# Patient Record
Sex: MALE | NOT HISPANIC OR LATINO | URBAN - METROPOLITAN AREA
[De-identification: names, ages, dates, MRNs, and addresses within clinical notes are randomized per-mention and may not be internally consistent; named-entity substitution may affect disease eponyms.]

---

## 2023-12-13 ENCOUNTER — TELEPHONE (OUTPATIENT)
Dept: HEPATOLOGY | Facility: CLINIC | Age: 68
End: 2023-12-13
Payer: MEDICARE

## 2023-12-21 ENCOUNTER — TELEPHONE (OUTPATIENT)
Dept: HEPATOLOGY | Facility: CLINIC | Age: 68
End: 2023-12-21
Payer: MEDICARE

## 2023-12-21 NOTE — TELEPHONE ENCOUNTER
Message sent to the Hepatology Coordinator to see if there are any sooner appointments to schedule for the patient.        ----- Message from Jax Capellan sent at 12/21/2023 10:32 AM CST -----  Regarding: Patient advice  Contact: Dr.Blunt Soni office called in regards to getting an sooner appointment date for Pt        office can be reached at [phone]

## 2025-01-01 ENCOUNTER — HOSPITAL ENCOUNTER (INPATIENT)
Facility: HOSPITAL | Age: 70
LOS: 1 days | DRG: 871 | End: 2025-03-28
Attending: EMERGENCY MEDICINE | Admitting: INTERNAL MEDICINE
Payer: MEDICARE

## 2025-01-01 VITALS
HEART RATE: 106 BPM | DIASTOLIC BLOOD PRESSURE: 34 MMHG | BODY MASS INDEX: 27.66 KG/M2 | SYSTOLIC BLOOD PRESSURE: 54 MMHG | WEIGHT: 176.56 LBS | RESPIRATION RATE: 24 BRPM | TEMPERATURE: 94 F | OXYGEN SATURATION: 100 %

## 2025-01-01 DIAGNOSIS — Z78.9 INTUBATION OF AIRWAY PERFORMED WITHOUT DIFFICULTY: ICD-10-CM

## 2025-01-01 DIAGNOSIS — R57.9 SHOCK: ICD-10-CM

## 2025-01-01 DIAGNOSIS — K72.91: ICD-10-CM

## 2025-01-01 DIAGNOSIS — R65.21 SEPSIS WITH ENCEPHALOPATHY AND SEPTIC SHOCK, DUE TO UNSPECIFIED ORGANISM: Primary | ICD-10-CM

## 2025-01-01 DIAGNOSIS — G93.41 ACUTE METABOLIC ENCEPHALOPATHY: ICD-10-CM

## 2025-01-01 DIAGNOSIS — G93.41 SEPSIS WITH ENCEPHALOPATHY AND SEPTIC SHOCK, DUE TO UNSPECIFIED ORGANISM: Primary | ICD-10-CM

## 2025-01-01 DIAGNOSIS — E87.29 HIGH ANION GAP METABOLIC ACIDOSIS: ICD-10-CM

## 2025-01-01 DIAGNOSIS — J96.01 ACUTE HYPOXEMIC RESPIRATORY FAILURE: ICD-10-CM

## 2025-01-01 DIAGNOSIS — A41.9 SEPSIS WITH ENCEPHALOPATHY AND SEPTIC SHOCK, DUE TO UNSPECIFIED ORGANISM: Primary | ICD-10-CM

## 2025-01-01 DIAGNOSIS — Z99.11 ON MECHANICALLY ASSISTED VENTILATION: ICD-10-CM

## 2025-01-01 DIAGNOSIS — J96.01 ACUTE RESPIRATORY FAILURE WITH HYPOXIA: ICD-10-CM

## 2025-01-01 DIAGNOSIS — N17.9 AKI (ACUTE KIDNEY INJURY): ICD-10-CM

## 2025-01-01 DIAGNOSIS — R06.02 SOB (SHORTNESS OF BREATH): ICD-10-CM

## 2025-01-01 DIAGNOSIS — K76.82 HEPATIC ENCEPHALOPATHY: ICD-10-CM

## 2025-01-01 LAB
ABSOLUTE EOSINOPHIL (OHS): 0.01 K/UL
ABSOLUTE MONOCYTE (OHS): 0.13 K/UL (ref 0.3–1)
ABSOLUTE NEUTROPHIL COUNT (OHS): 3.64 K/UL (ref 1.8–7.7)
ABSOLUTE NEUTROPHIL MANUAL (OHS): 0.4 K/UL
ADENOVIRUS: NOT DETECTED
ALBUMIN SERPL BCP-MCNC: 1.4 G/DL (ref 3.5–5.2)
ALBUMIN SERPL BCP-MCNC: 1.9 G/DL (ref 3.5–5.2)
ALBUMIN SERPL BCP-MCNC: 2 G/DL (ref 3.5–5.2)
ALLENS TEST: ABNORMAL
ALP SERPL-CCNC: 51 UNIT/L (ref 40–150)
ALP SERPL-CCNC: 60 UNIT/L (ref 40–150)
ALP SERPL-CCNC: 84 UNIT/L (ref 40–150)
ALT SERPL W/O P-5'-P-CCNC: 221 UNIT/L (ref 10–44)
ALT SERPL W/O P-5'-P-CCNC: 611 UNIT/L (ref 10–44)
ALT SERPL W/O P-5'-P-CCNC: 79 UNIT/L (ref 10–44)
AMMONIA PLAS-SCNC: 167 UMOL/L (ref 10–50)
AMMONIA PLAS-SCNC: 303 UMOL/L (ref 10–50)
ANION GAP (OHS): 26 MMOL/L (ref 8–16)
ANION GAP (OHS): 27 MMOL/L (ref 8–16)
ANION GAP (OHS): 27 MMOL/L (ref 8–16)
ANION GAP (OHS): 30 MMOL/L (ref 8–16)
APAP SERPL-MCNC: <3 UG/ML (ref 10–20)
APTT PPP: 49.2 SECONDS (ref 21–32)
APTT PPP: 88.3 SECONDS (ref 21–32)
AST SERPL-CCNC: 1164 UNIT/L (ref 11–45)
AST SERPL-CCNC: 325 UNIT/L (ref 11–45)
AST SERPL-CCNC: 4112 UNIT/L (ref 11–45)
BASOPHILS # BLD AUTO: 0.01 K/UL
BASOPHILS NFR BLD AUTO: 0.2 %
BILIRUB DIRECT SERPL-MCNC: 2.5 MG/DL (ref 0.1–0.3)
BILIRUB DIRECT SERPL-MCNC: 3.3 MG/DL (ref 0.1–0.3)
BILIRUB SERPL-MCNC: 3.2 MG/DL (ref 0.1–1)
BILIRUB SERPL-MCNC: 4 MG/DL (ref 0.1–1)
BILIRUB SERPL-MCNC: 4.5 MG/DL (ref 0.1–1)
BILIRUB UR QL STRIP.AUTO: ABNORMAL
BNP SERPL-MCNC: 356 PG/ML (ref 0–99)
BORDETELLA PARAPERTUSSIS (IS1001): NOT DETECTED
BORDETELLA PERTUSSIS (PTXP): NOT DETECTED
BUN SERPL-MCNC: 50 MG/DL (ref 8–23)
BUN SERPL-MCNC: 50 MG/DL (ref 8–23)
BUN SERPL-MCNC: 53 MG/DL (ref 8–23)
BUN SERPL-MCNC: 58 MG/DL (ref 8–23)
BURR CELLS BLD QL SMEAR: ABNORMAL
C DIFF GDH STL QL: NEGATIVE
C DIFF TOX A+B STL QL IA: NEGATIVE
CALCIUM SERPL-MCNC: 6.8 MG/DL (ref 8.7–10.5)
CALCIUM SERPL-MCNC: 7 MG/DL (ref 8.7–10.5)
CALCIUM SERPL-MCNC: 7.2 MG/DL (ref 8.7–10.5)
CALCIUM SERPL-MCNC: 8.6 MG/DL (ref 8.7–10.5)
CHLAMYDIA PNEUMONIAE: NOT DETECTED
CHLORIDE SERPL-SCNC: 105 MMOL/L (ref 95–110)
CHLORIDE SERPL-SCNC: 107 MMOL/L (ref 95–110)
CHLORIDE SERPL-SCNC: 108 MMOL/L (ref 95–110)
CHLORIDE SERPL-SCNC: 111 MMOL/L (ref 95–110)
CK SERPL-CCNC: 2699 U/L (ref 20–200)
CK SERPL-CCNC: 6168 U/L (ref 20–200)
CLARITY UR: CLEAR
CO2 SERPL-SCNC: 5 MMOL/L (ref 23–29)
CO2 SERPL-SCNC: 6 MMOL/L (ref 23–29)
COLOR UR AUTO: YELLOW
CORONAVIRUS 229E, COMMON COLD VIRUS: NOT DETECTED
CORONAVIRUS HKU1, COMMON COLD VIRUS: NOT DETECTED
CORONAVIRUS NL63, COMMON COLD VIRUS: NOT DETECTED
CORONAVIRUS OC43, COMMON COLD VIRUS: NOT DETECTED
CORTIS SERPL-MCNC: 30.3 UG/DL
CREAT SERPL-MCNC: 2.6 MG/DL (ref 0.5–1.4)
CREAT SERPL-MCNC: 2.6 MG/DL (ref 0.5–1.4)
CREAT SERPL-MCNC: 2.7 MG/DL (ref 0.5–1.4)
CREAT SERPL-MCNC: 2.7 MG/DL (ref 0.5–1.4)
DELSYS: ABNORMAL
ERYTHROCYTE [DISTWIDTH] IN BLOOD BY AUTOMATED COUNT: 15.4 % (ref 11.5–14.5)
ERYTHROCYTE [DISTWIDTH] IN BLOOD BY AUTOMATED COUNT: 15.7 % (ref 11.5–14.5)
ERYTHROCYTE [SEDIMENTATION RATE] IN BLOOD BY WESTERGREN METHOD: 18 MM/H
ERYTHROCYTE [SEDIMENTATION RATE] IN BLOOD BY WESTERGREN METHOD: 24 MM/H
ERYTHROCYTE [SEDIMENTATION RATE] IN BLOOD BY WESTERGREN METHOD: 24 MM/H
FIBRINOGEN PPP-MCNC: 299 MG/DL (ref 182–400)
FIO2: 100
FLOW: 15
FLUBV RNA NPH QL NAA+NON-PROBE: NOT DETECTED
FOLATE SERPL-MCNC: 10.3 NG/ML (ref 4–24)
GFR SERPLBLD CREATININE-BSD FMLA CKD-EPI: 25 ML/MIN/1.73/M2
GFR SERPLBLD CREATININE-BSD FMLA CKD-EPI: 25 ML/MIN/1.73/M2
GFR SERPLBLD CREATININE-BSD FMLA CKD-EPI: 26 ML/MIN/1.73/M2
GFR SERPLBLD CREATININE-BSD FMLA CKD-EPI: 26 ML/MIN/1.73/M2
GLUCOSE SERPL-MCNC: 33 MG/DL (ref 70–110)
GLUCOSE SERPL-MCNC: 360 MG/DL (ref 70–110)
GLUCOSE SERPL-MCNC: 64 MG/DL (ref 70–110)
GLUCOSE SERPL-MCNC: 72 MG/DL (ref 70–110)
GLUCOSE SERPL-MCNC: 77 MG/DL (ref 70–110)
GLUCOSE SERPL-MCNC: 81 MG/DL (ref 70–110)
GLUCOSE UR QL STRIP: NEGATIVE
HCO3 UR-SCNC: 10 MMOL/L (ref 24–28)
HCO3 UR-SCNC: 8 MMOL/L (ref 24–28)
HCO3 UR-SCNC: 8 MMOL/L (ref 24–28)
HCO3 UR-SCNC: 9.8 MMOL/L (ref 24–28)
HCT VFR BLD AUTO: 41.6 % (ref 40–54)
HCT VFR BLD AUTO: 41.6 % (ref 40–54)
HCT VFR BLD AUTO: 49.1 % (ref 40–54)
HCT VFR BLD CALC: 39 %PCV (ref 36–54)
HCT VFR BLD CALC: 41 %PCV (ref 36–54)
HGB BLD-MCNC: 12.7 GM/DL (ref 14–18)
HGB BLD-MCNC: 12.7 GM/DL (ref 14–18)
HGB BLD-MCNC: 15.4 GM/DL (ref 14–18)
HGB UR QL STRIP: NEGATIVE
HPIV1 RNA NPH QL NAA+NON-PROBE: NOT DETECTED
HPIV2 RNA NPH QL NAA+NON-PROBE: NOT DETECTED
HPIV3 RNA NPH QL NAA+NON-PROBE: NOT DETECTED
HPIV4 RNA NPH QL NAA+NON-PROBE: NOT DETECTED
HUMAN METAPNEUMOVIRUS: NOT DETECTED
IMM GRANULOCYTES # BLD AUTO: 0.04 K/UL (ref 0–0.04)
IMM GRANULOCYTES NFR BLD AUTO: 0.8 % (ref 0–0.5)
INFLUENZA A MOLECULAR (OHS): NEGATIVE
INFLUENZA A: NOT DETECTED
INFLUENZA B MOLECULAR (OHS): NEGATIVE
INR PPP: 1.8 (ref 0.8–1.2)
INR PPP: 2.1 (ref 0.8–1.2)
INR PPP: 2.5 (ref 0.8–1.2)
KETONES UR QL STRIP: NEGATIVE
LACTATE SERPL-SCNC: >12 MMOL/L (ref 0.5–2.2)
LEUKOCYTE ESTERASE UR QL STRIP: NEGATIVE
LIPASE SERPL-CCNC: 16 U/L (ref 4–60)
LIPASE SERPL-CCNC: 16 U/L (ref 4–60)
LYMPHOCYTES # BLD AUTO: 0.9 K/UL (ref 1–4.8)
LYMPHOCYTES NFR BLD MANUAL: 58 % (ref 18–48)
MAGNESIUM SERPL-MCNC: 3 MG/DL (ref 1.6–2.6)
MAGNESIUM SERPL-MCNC: 3.7 MG/DL (ref 1.6–2.6)
MCH RBC QN AUTO: 31 PG (ref 27–50)
MCH RBC QN AUTO: 31.3 PG (ref 27–50)
MCHC RBC AUTO-ENTMCNC: 30.5 G/DL (ref 32–36)
MCHC RBC AUTO-ENTMCNC: 31.4 G/DL (ref 32–36)
MCV RBC AUTO: 100 FL (ref 82–98)
MCV RBC AUTO: 102 FL (ref 82–98)
METHEMOGLOBIN: -0.3 % (ref 0–3)
MODE: ABNORMAL
MONOCYTES NFR BLD MANUAL: 12 % (ref 4–15)
MYCOPLASMA PNEUMONIAE: NOT DETECTED
NEUTROPHILS NFR BLD MANUAL: 30 % (ref 38–73)
NITRITE UR QL STRIP: NEGATIVE
NUCLEATED RBC (/100WBC) (OHS): 2 /100 WBC
NUCLEATED RBC (/100WBC) (OHS): 26 /100 WBC
OHS QRS DURATION: 108 MS
OHS QTC CALCULATION: 512 MS
PCO2 BLDA: 42.6 MMHG (ref 35–45)
PCO2 BLDA: 47.8 MMHG (ref 35–45)
PCO2 BLDA: 55.1 MMHG (ref 35–45)
PCO2 BLDA: 61.1 MMHG (ref 35–45)
PEEP: 10
PEEP: 5
PEEP: 5
PH SMN: 6.82 [PH] (ref 7.35–7.45)
PH SMN: 6.83 [PH] (ref 7.35–7.45)
PH SMN: 6.86 [PH] (ref 7.35–7.45)
PH SMN: 6.88 [PH] (ref 7.35–7.45)
PH UR STRIP: 6 [PH]
PHOSPHATE SERPL-MCNC: 10 MG/DL (ref 2.7–4.5)
PLATELET # BLD AUTO: 230 K/UL (ref 150–450)
PLATELET # BLD AUTO: 87 K/UL (ref 150–450)
PLATELET BLD QL SMEAR: ABNORMAL
PMV BLD AUTO: 9.5 FL (ref 9.2–12.9)
PMV BLD AUTO: 9.7 FL (ref 9.2–12.9)
PO2 BLDA: 102 MMHG (ref 80–100)
PO2 BLDA: 114 MMHG (ref 80–100)
PO2 BLDA: 63 MMHG (ref 80–100)
PO2 BLDA: 98 MMHG (ref 80–100)
POC BE: -24 MMOL/L
POC BE: -24 MMOL/L
POC BE: -25 MMOL/L
POC BE: -26 MMOL/L
POC IONIZED CALCIUM: 1 MMOL/L (ref 1.06–1.42)
POC IONIZED CALCIUM: 1.03 MMOL/L (ref 1.06–1.42)
POC SATURATED O2: 69 % (ref 95–100)
POC SATURATED O2: 89 % (ref 95–100)
POC SATURATED O2: 90 % (ref 95–100)
POC SATURATED O2: 92 % (ref 95–100)
POCT GLUCOSE: 100 MG/DL (ref 70–110)
POCT GLUCOSE: 100 MG/DL (ref 70–110)
POCT GLUCOSE: 101 MG/DL (ref 70–110)
POCT GLUCOSE: 105 MG/DL (ref 70–110)
POCT GLUCOSE: 110 MG/DL (ref 70–110)
POCT GLUCOSE: 38 MG/DL (ref 70–110)
POCT GLUCOSE: 64 MG/DL (ref 70–110)
POCT GLUCOSE: 69 MG/DL (ref 70–110)
POCT GLUCOSE: 73 MG/DL (ref 70–110)
POCT GLUCOSE: 75 MG/DL (ref 70–110)
POCT GLUCOSE: 78 MG/DL (ref 70–110)
POCT GLUCOSE: 81 MG/DL (ref 70–110)
POCT GLUCOSE: 85 MG/DL (ref 70–110)
POCT GLUCOSE: 96 MG/DL (ref 70–110)
POIKILOCYTOSIS BLD QL SMEAR: ABNORMAL
POLYCHROMASIA BLD QL SMEAR: ABNORMAL
POTASSIUM BLD-SCNC: 3.1 MMOL/L (ref 3.5–5.1)
POTASSIUM BLD-SCNC: 3.4 MMOL/L (ref 3.5–5.1)
POTASSIUM SERPL-SCNC: 3.2 MMOL/L (ref 3.5–5.1)
POTASSIUM SERPL-SCNC: 3.3 MMOL/L (ref 3.5–5.1)
POTASSIUM SERPL-SCNC: 3.9 MMOL/L (ref 3.5–5.1)
POTASSIUM SERPL-SCNC: 4.9 MMOL/L (ref 3.5–5.1)
PROCALCITONIN SERPL-MCNC: 7.52 NG/ML
PROLACTIN SERPL IA-MCNC: 31.9 NG/ML (ref 3.5–19.4)
PROT SERPL-MCNC: 3.4 GM/DL (ref 6–8.4)
PROT SERPL-MCNC: 3.5 GM/DL (ref 6–8.4)
PROT SERPL-MCNC: 4.7 GM/DL (ref 6–8.4)
PROT UR QL STRIP: ABNORMAL
PROTHROMBIN TIME: 19.6 SECONDS (ref 9–12.5)
PROTHROMBIN TIME: 22.6 SECONDS (ref 9–12.5)
PROTHROMBIN TIME: 27.1 SECONDS (ref 9–12.5)
RBC # BLD AUTO: 4.1 M/UL (ref 4.6–6.2)
RBC # BLD AUTO: 4.92 M/UL (ref 4.6–6.2)
RELATIVE EOSINOPHIL (OHS): 0.2 %
RELATIVE LYMPHOCYTE (OHS): 19 % (ref 18–48)
RELATIVE MONOCYTE (OHS): 2.7 % (ref 4–15)
RELATIVE NEUTROPHIL (OHS): 77.1 % (ref 38–73)
RESPIRATORY INFECTION PANEL SOURCE: NORMAL
RSV RNA NPH QL NAA+NON-PROBE: NOT DETECTED
RV+EV RNA NPH QL NAA+NON-PROBE: NOT DETECTED
SALICYLATES SERPL-MCNC: <5 MG/DL (ref 15–30)
SAMPLE: ABNORMAL
SARS-COV-2 RDRP RESP QL NAA+PROBE: NEGATIVE
SARS-COV-2 RNA RESP QL NAA+PROBE: NOT DETECTED
SITE: ABNORMAL
SODIUM BLD-SCNC: 134 MMOL/L (ref 136–145)
SODIUM BLD-SCNC: 139 MMOL/L (ref 136–145)
SODIUM SERPL-SCNC: 139 MMOL/L (ref 136–145)
SODIUM SERPL-SCNC: 140 MMOL/L (ref 136–145)
SODIUM SERPL-SCNC: 141 MMOL/L (ref 136–145)
SODIUM SERPL-SCNC: 144 MMOL/L (ref 136–145)
SP GR UR STRIP: 1.01
T3FREE SERPL-MCNC: <1.5 PG/ML (ref 2.3–4.2)
T4 FREE SERPL-MCNC: 0.91 NG/DL (ref 0.71–1.51)
TROPONIN I SERPL DL<=0.01 NG/ML-MCNC: 0.05 NG/ML
TROPONIN I SERPL DL<=0.01 NG/ML-MCNC: 0.06 NG/ML
TROPONIN I SERPL DL<=0.01 NG/ML-MCNC: 0.07 NG/ML
TSH SERPL-ACNC: 10.78 UIU/ML (ref 0.4–4)
UROBILINOGEN UR STRIP-ACNC: NEGATIVE EU/DL
VT: 460
WBC # BLD AUTO: 1.41 K/UL (ref 3.9–12.7)
WBC # BLD AUTO: 4.73 K/UL (ref 3.9–12.7)

## 2025-01-01 PROCEDURE — 84484 ASSAY OF TROPONIN QUANT: CPT

## 2025-01-01 PROCEDURE — 83605 ASSAY OF LACTIC ACID: CPT

## 2025-01-01 PROCEDURE — 96365 THER/PROPH/DIAG IV INF INIT: CPT

## 2025-01-01 PROCEDURE — 82746 ASSAY OF FOLIC ACID SERUM: CPT | Performed by: INTERNAL MEDICINE

## 2025-01-01 PROCEDURE — 82550 ASSAY OF CK (CPK): CPT | Performed by: INTERNAL MEDICINE

## 2025-01-01 PROCEDURE — 85014 HEMATOCRIT: CPT

## 2025-01-01 PROCEDURE — 84145 PROCALCITONIN (PCT): CPT

## 2025-01-01 PROCEDURE — 87427 SHIGA-LIKE TOXIN AG IA: CPT | Performed by: EMERGENCY MEDICINE

## 2025-01-01 PROCEDURE — 82962 GLUCOSE BLOOD TEST: CPT

## 2025-01-01 PROCEDURE — 37799 UNLISTED PX VASCULAR SURGERY: CPT

## 2025-01-01 PROCEDURE — 93005 ELECTROCARDIOGRAM TRACING: CPT

## 2025-01-01 PROCEDURE — 25000242 PHARM REV CODE 250 ALT 637 W/ HCPCS

## 2025-01-01 PROCEDURE — 25000003 PHARM REV CODE 250: Performed by: INTERNAL MEDICINE

## 2025-01-01 PROCEDURE — 84100 ASSAY OF PHOSPHORUS: CPT

## 2025-01-01 PROCEDURE — 82248 BILIRUBIN DIRECT: CPT

## 2025-01-01 PROCEDURE — 84295 ASSAY OF SERUM SODIUM: CPT

## 2025-01-01 PROCEDURE — 83735 ASSAY OF MAGNESIUM: CPT

## 2025-01-01 PROCEDURE — 83735 ASSAY OF MAGNESIUM: CPT | Performed by: EMERGENCY MEDICINE

## 2025-01-01 PROCEDURE — 96366 THER/PROPH/DIAG IV INF ADDON: CPT

## 2025-01-01 PROCEDURE — 87046 STOOL CULTR AEROBIC BACT EA: CPT | Performed by: EMERGENCY MEDICINE

## 2025-01-01 PROCEDURE — 87205 SMEAR GRAM STAIN: CPT

## 2025-01-01 PROCEDURE — 25000003 PHARM REV CODE 250

## 2025-01-01 PROCEDURE — 94002 VENT MGMT INPAT INIT DAY: CPT

## 2025-01-01 PROCEDURE — 82140 ASSAY OF AMMONIA: CPT

## 2025-01-01 PROCEDURE — 80076 HEPATIC FUNCTION PANEL: CPT

## 2025-01-01 PROCEDURE — 31500 INSERT EMERGENCY AIRWAY: CPT

## 2025-01-01 PROCEDURE — 83605 ASSAY OF LACTIC ACID: CPT | Performed by: EMERGENCY MEDICINE

## 2025-01-01 PROCEDURE — 84075 ASSAY ALKALINE PHOSPHATASE: CPT | Performed by: EMERGENCY MEDICINE

## 2025-01-01 PROCEDURE — 85025 COMPLETE CBC W/AUTO DIFF WBC: CPT

## 2025-01-01 PROCEDURE — 82803 BLOOD GASES ANY COMBINATION: CPT

## 2025-01-01 PROCEDURE — 63600175 PHARM REV CODE 636 W HCPCS

## 2025-01-01 PROCEDURE — 25000003 PHARM REV CODE 250: Performed by: EMERGENCY MEDICINE

## 2025-01-01 PROCEDURE — 96360 HYDRATION IV INFUSION INIT: CPT | Mod: 59

## 2025-01-01 PROCEDURE — 81003 URINALYSIS AUTO W/O SCOPE: CPT | Performed by: EMERGENCY MEDICINE

## 2025-01-01 PROCEDURE — 51702 INSERT TEMP BLADDER CATH: CPT

## 2025-01-01 PROCEDURE — 80143 DRUG ASSAY ACETAMINOPHEN: CPT

## 2025-01-01 PROCEDURE — 27100108

## 2025-01-01 PROCEDURE — 94761 N-INVAS EAR/PLS OXIMETRY MLT: CPT

## 2025-01-01 PROCEDURE — 82330 ASSAY OF CALCIUM: CPT

## 2025-01-01 PROCEDURE — 87081 CULTURE SCREEN ONLY: CPT

## 2025-01-01 PROCEDURE — 82140 ASSAY OF AMMONIA: CPT | Performed by: EMERGENCY MEDICINE

## 2025-01-01 PROCEDURE — 85610 PROTHROMBIN TIME: CPT

## 2025-01-01 PROCEDURE — 63600175 PHARM REV CODE 636 W HCPCS: Performed by: INTERNAL MEDICINE

## 2025-01-01 PROCEDURE — 94003 VENT MGMT INPAT SUBQ DAY: CPT

## 2025-01-01 PROCEDURE — 83690 ASSAY OF LIPASE: CPT | Performed by: INTERNAL MEDICINE

## 2025-01-01 PROCEDURE — 27100171 HC OXYGEN HIGH FLOW UP TO 24 HOURS

## 2025-01-01 PROCEDURE — 94640 AIRWAY INHALATION TREATMENT: CPT

## 2025-01-01 PROCEDURE — 36600 WITHDRAWAL OF ARTERIAL BLOOD: CPT

## 2025-01-01 PROCEDURE — 84439 ASSAY OF FREE THYROXINE: CPT | Performed by: EMERGENCY MEDICINE

## 2025-01-01 PROCEDURE — U0002 COVID-19 LAB TEST NON-CDC: HCPCS | Performed by: EMERGENCY MEDICINE

## 2025-01-01 PROCEDURE — 5A12012 PERFORMANCE OF CARDIAC OUTPUT, SINGLE, MANUAL: ICD-10-PCS | Performed by: INTERNAL MEDICINE

## 2025-01-01 PROCEDURE — 82533 TOTAL CORTISOL: CPT | Performed by: INTERNAL MEDICINE

## 2025-01-01 PROCEDURE — 99900035 HC TECH TIME PER 15 MIN (STAT)

## 2025-01-01 PROCEDURE — 93010 ELECTROCARDIOGRAM REPORT: CPT | Mod: ,,, | Performed by: INTERNAL MEDICINE

## 2025-01-01 PROCEDURE — P9047 ALBUMIN (HUMAN), 25%, 50ML: HCPCS

## 2025-01-01 PROCEDURE — 84481 FREE ASSAY (FT-3): CPT

## 2025-01-01 PROCEDURE — 85610 PROTHROMBIN TIME: CPT | Performed by: EMERGENCY MEDICINE

## 2025-01-01 PROCEDURE — 84484 ASSAY OF TROPONIN QUANT: CPT | Performed by: EMERGENCY MEDICINE

## 2025-01-01 PROCEDURE — 85730 THROMBOPLASTIN TIME PARTIAL: CPT | Performed by: EMERGENCY MEDICINE

## 2025-01-01 PROCEDURE — 96368 THER/DIAG CONCURRENT INF: CPT

## 2025-01-01 PROCEDURE — 87449 NOS EACH ORGANISM AG IA: CPT

## 2025-01-01 PROCEDURE — 85018 HEMOGLOBIN: CPT

## 2025-01-01 PROCEDURE — 85025 COMPLETE CBC W/AUTO DIFF WBC: CPT | Performed by: EMERGENCY MEDICINE

## 2025-01-01 PROCEDURE — 95816 EEG AWAKE AND DROWSY: CPT

## 2025-01-01 PROCEDURE — 0BH17EZ INSERTION OF ENDOTRACHEAL AIRWAY INTO TRACHEA, VIA NATURAL OR ARTIFICIAL OPENING: ICD-10-PCS | Performed by: INTERNAL MEDICINE

## 2025-01-01 PROCEDURE — 85730 THROMBOPLASTIN TIME PARTIAL: CPT

## 2025-01-01 PROCEDURE — 87449 NOS EACH ORGANISM AG IA: CPT | Performed by: EMERGENCY MEDICINE

## 2025-01-01 PROCEDURE — 80179 DRUG ASSAY SALICYLATE: CPT

## 2025-01-01 PROCEDURE — 83880 ASSAY OF NATRIURETIC PEPTIDE: CPT | Performed by: EMERGENCY MEDICINE

## 2025-01-01 PROCEDURE — 96361 HYDRATE IV INFUSION ADD-ON: CPT

## 2025-01-01 PROCEDURE — 83690 ASSAY OF LIPASE: CPT | Performed by: EMERGENCY MEDICINE

## 2025-01-01 PROCEDURE — 87502 INFLUENZA DNA AMP PROBE: CPT | Performed by: EMERGENCY MEDICINE

## 2025-01-01 PROCEDURE — 87040 BLOOD CULTURE FOR BACTERIA: CPT | Performed by: EMERGENCY MEDICINE

## 2025-01-01 PROCEDURE — 02HV33Z INSERTION OF INFUSION DEVICE INTO SUPERIOR VENA CAVA, PERCUTANEOUS APPROACH: ICD-10-PCS | Performed by: INTERNAL MEDICINE

## 2025-01-01 PROCEDURE — 0202U NFCT DS 22 TRGT SARS-COV-2: CPT

## 2025-01-01 PROCEDURE — 5A1935Z RESPIRATORY VENTILATION, LESS THAN 24 CONSECUTIVE HOURS: ICD-10-PCS | Performed by: INTERNAL MEDICINE

## 2025-01-01 PROCEDURE — 85384 FIBRINOGEN ACTIVITY: CPT

## 2025-01-01 PROCEDURE — 36415 COLL VENOUS BLD VENIPUNCTURE: CPT | Performed by: EMERGENCY MEDICINE

## 2025-01-01 PROCEDURE — 95816 EEG AWAKE AND DROWSY: CPT | Mod: 26,,, | Performed by: PSYCHIATRY & NEUROLOGY

## 2025-01-01 PROCEDURE — 84146 ASSAY OF PROLACTIN: CPT

## 2025-01-01 PROCEDURE — 82550 ASSAY OF CK (CPK): CPT

## 2025-01-01 PROCEDURE — 20000000 HC ICU ROOM

## 2025-01-01 PROCEDURE — 63600367 HC NITRIC OXIDE PER HOUR

## 2025-01-01 PROCEDURE — 99291 CRITICAL CARE FIRST HOUR: CPT

## 2025-01-01 PROCEDURE — 80048 BASIC METABOLIC PNL TOTAL CA: CPT

## 2025-01-01 PROCEDURE — 95822 EEG COMA OR SLEEP ONLY: CPT

## 2025-01-01 PROCEDURE — 84132 ASSAY OF SERUM POTASSIUM: CPT

## 2025-01-01 PROCEDURE — 63600175 PHARM REV CODE 636 W HCPCS: Performed by: EMERGENCY MEDICINE

## 2025-01-01 RX ORDER — CALCIUM CHLORIDE INJECTION 100 MG/ML
1 INJECTION, SOLUTION INTRAVENOUS ONCE
Status: DISCONTINUED | OUTPATIENT
Start: 2025-01-01 | End: 2025-01-01 | Stop reason: HOSPADM

## 2025-01-01 RX ORDER — SODIUM BICARBONATE 1 MEQ/ML
SYRINGE (ML) INTRAVENOUS CODE/TRAUMA/SEDATION MEDICATION
Status: COMPLETED | OUTPATIENT
Start: 2025-01-01 | End: 2025-01-01

## 2025-01-01 RX ORDER — NOREPINEPHRINE BITARTRATE 0.02 MG/ML
INJECTION, SOLUTION INTRAVENOUS
Status: DISCONTINUED
Start: 2025-01-01 | End: 2025-01-01 | Stop reason: HOSPADM

## 2025-01-01 RX ORDER — FENTANYL CITRATE-0.9 % NACL/PF 10 MCG/ML
0-250 PLASTIC BAG, INJECTION (ML) INTRAVENOUS CONTINUOUS
Refills: 0 | Status: DISCONTINUED | OUTPATIENT
Start: 2025-01-01 | End: 2025-01-01 | Stop reason: HOSPADM

## 2025-01-01 RX ORDER — INDOMETHACIN 25 MG/1
100 CAPSULE ORAL ONCE
Status: COMPLETED | OUTPATIENT
Start: 2025-01-01 | End: 2025-01-01

## 2025-01-01 RX ORDER — VASOPRESSIN IN DEXTROSE 5 % 25/250 ML
0.04 PLASTIC BAG, INJECTION (ML) INTRAVENOUS CONTINUOUS
Status: DISCONTINUED | OUTPATIENT
Start: 2025-01-01 | End: 2025-01-01 | Stop reason: HOSPADM

## 2025-01-01 RX ORDER — ROCURONIUM BROMIDE 10 MG/ML
80 INJECTION, SOLUTION INTRAVENOUS ONCE
Status: DISCONTINUED | OUTPATIENT
Start: 2025-01-01 | End: 2025-01-01 | Stop reason: HOSPADM

## 2025-01-01 RX ORDER — NOREPINEPHRINE BITARTRATE 0.02 MG/ML
INJECTION, SOLUTION INTRAVENOUS
Status: COMPLETED
Start: 2025-01-01 | End: 2025-01-01

## 2025-01-01 RX ORDER — THIAMINE HYDROCHLORIDE 100 MG/ML
400 INJECTION, SOLUTION INTRAMUSCULAR; INTRAVENOUS DAILY
Status: DISCONTINUED | OUTPATIENT
Start: 2025-01-01 | End: 2025-01-01 | Stop reason: HOSPADM

## 2025-01-01 RX ORDER — ALBUMIN HUMAN 50 G/1000ML
25 SOLUTION INTRAVENOUS ONCE
Status: DISCONTINUED | OUTPATIENT
Start: 2025-01-01 | End: 2025-01-01 | Stop reason: HOSPADM

## 2025-01-01 RX ORDER — ETOMIDATE 2 MG/ML
INJECTION INTRAVENOUS
Status: COMPLETED
Start: 2025-01-01 | End: 2025-01-01

## 2025-01-01 RX ORDER — ALBUMIN HUMAN 250 G/1000ML
25 SOLUTION INTRAVENOUS EVERY 8 HOURS
Status: DISCONTINUED | OUTPATIENT
Start: 2025-01-01 | End: 2025-01-01 | Stop reason: HOSPADM

## 2025-01-01 RX ORDER — VASOPRESSIN IN DEXTROSE 5 % 25/250 ML
PLASTIC BAG, INJECTION (ML) INTRAVENOUS
Status: COMPLETED
Start: 2025-01-01 | End: 2025-01-01

## 2025-01-01 RX ORDER — CALCIUM CHLORIDE INJECTION 100 MG/ML
INJECTION, SOLUTION INTRAVENOUS
Status: COMPLETED
Start: 2025-01-01 | End: 2025-01-01

## 2025-01-01 RX ORDER — DEXTROSE MONOHYDRATE 100 MG/ML
INJECTION, SOLUTION INTRAVENOUS CONTINUOUS
Status: DISCONTINUED | OUTPATIENT
Start: 2025-01-01 | End: 2025-01-01 | Stop reason: HOSPADM

## 2025-01-01 RX ORDER — LORAZEPAM 2 MG/ML
2 INJECTION INTRAMUSCULAR
Status: DISCONTINUED | OUTPATIENT
Start: 2025-01-01 | End: 2025-01-01 | Stop reason: HOSPADM

## 2025-01-01 RX ORDER — CALCIUM CHLORIDE INJECTION 100 MG/ML
INJECTION, SOLUTION INTRAVENOUS CODE/TRAUMA/SEDATION MEDICATION
Status: COMPLETED | OUTPATIENT
Start: 2025-01-01 | End: 2025-01-01

## 2025-01-01 RX ORDER — ALBUMIN HUMAN 50 G/1000ML
25 SOLUTION INTRAVENOUS ONCE
Status: DISCONTINUED | OUTPATIENT
Start: 2025-01-01 | End: 2025-01-01

## 2025-01-01 RX ORDER — CALCIUM CHLORIDE INJECTION 100 MG/ML
INJECTION, SOLUTION INTRAVENOUS
Status: DISCONTINUED
Start: 2025-01-01 | End: 2025-01-01 | Stop reason: HOSPADM

## 2025-01-01 RX ORDER — IPRATROPIUM BROMIDE AND ALBUTEROL SULFATE 2.5; .5 MG/3ML; MG/3ML
3 SOLUTION RESPIRATORY (INHALATION) EVERY 6 HOURS
Status: DISCONTINUED | OUTPATIENT
Start: 2025-01-01 | End: 2025-01-01 | Stop reason: HOSPADM

## 2025-01-01 RX ORDER — EPINEPHRINE 0.1 MG/ML
INJECTION INTRAVENOUS CODE/TRAUMA/SEDATION MEDICATION
Status: COMPLETED | OUTPATIENT
Start: 2025-01-01 | End: 2025-01-01

## 2025-01-01 RX ORDER — ETOMIDATE 2 MG/ML
20 INJECTION INTRAVENOUS
Status: COMPLETED | OUTPATIENT
Start: 2025-01-01 | End: 2025-01-01

## 2025-01-01 RX ORDER — FAMOTIDINE 20 MG/1
20 TABLET, FILM COATED ORAL EVERY OTHER DAY
Status: DISCONTINUED | OUTPATIENT
Start: 2025-01-01 | End: 2025-01-01 | Stop reason: HOSPADM

## 2025-01-01 RX ORDER — DEXTROSE MONOHYDRATE AND SODIUM CHLORIDE 5; .9 G/100ML; G/100ML
1000 INJECTION, SOLUTION INTRAVENOUS
Status: DISCONTINUED | OUTPATIENT
Start: 2025-01-01 | End: 2025-01-01

## 2025-01-01 RX ORDER — ROCURONIUM BROMIDE 10 MG/ML
1 INJECTION, SOLUTION INTRAVENOUS ONCE
Status: COMPLETED | OUTPATIENT
Start: 2025-01-01 | End: 2025-01-01

## 2025-01-01 RX ORDER — ROCURONIUM BROMIDE 10 MG/ML
INJECTION, SOLUTION INTRAVENOUS
Status: COMPLETED
Start: 2025-01-01 | End: 2025-01-01

## 2025-01-01 RX ORDER — LACTULOSE 10 G/15ML
200 SOLUTION ORAL; RECTAL EVERY 8 HOURS
Status: DISCONTINUED | OUTPATIENT
Start: 2025-01-01 | End: 2025-01-01 | Stop reason: HOSPADM

## 2025-01-01 RX ORDER — MUPIROCIN 20 MG/G
OINTMENT TOPICAL 2 TIMES DAILY
Status: DISCONTINUED | OUTPATIENT
Start: 2025-01-01 | End: 2025-01-01 | Stop reason: HOSPADM

## 2025-01-01 RX ORDER — INDOMETHACIN 25 MG/1
CAPSULE ORAL
Status: DISCONTINUED
Start: 2025-01-01 | End: 2025-01-01 | Stop reason: HOSPADM

## 2025-01-01 RX ORDER — FAMOTIDINE 20 MG/1
20 TABLET, FILM COATED ORAL DAILY
Status: DISCONTINUED | OUTPATIENT
Start: 2025-01-01 | End: 2025-01-01

## 2025-01-01 RX ORDER — NOREPINEPHRINE BITARTRATE 0.02 MG/ML
0-3 INJECTION, SOLUTION INTRAVENOUS CONTINUOUS
Status: DISCONTINUED | OUTPATIENT
Start: 2025-01-01 | End: 2025-01-01

## 2025-01-01 RX ORDER — ROCURONIUM BROMIDE 10 MG/ML
INJECTION, SOLUTION INTRAVENOUS
Status: DISCONTINUED
Start: 2025-01-01 | End: 2025-01-01 | Stop reason: HOSPADM

## 2025-01-01 RX ORDER — POTASSIUM CHLORIDE 29.8 MG/ML
40 INJECTION INTRAVENOUS ONCE
Status: COMPLETED | OUTPATIENT
Start: 2025-01-01 | End: 2025-01-01

## 2025-01-01 RX ORDER — DEXTROSE MONOHYDRATE 100 MG/ML
INJECTION, SOLUTION INTRAVENOUS
Status: COMPLETED | OUTPATIENT
Start: 2025-01-01 | End: 2025-01-01

## 2025-01-01 RX ORDER — CHLORHEXIDINE GLUCONATE ORAL RINSE 1.2 MG/ML
15 SOLUTION DENTAL 2 TIMES DAILY
Status: DISCONTINUED | OUTPATIENT
Start: 2025-01-01 | End: 2025-01-01 | Stop reason: HOSPADM

## 2025-01-01 RX ORDER — CALCIUM CHLORIDE INJECTION 100 MG/ML
1 INJECTION, SOLUTION INTRAVENOUS ONCE
Status: COMPLETED | OUTPATIENT
Start: 2025-01-01 | End: 2025-01-01

## 2025-01-01 RX ORDER — PROPOFOL 10 MG/ML
0-50 INJECTION, EMULSION INTRAVENOUS CONTINUOUS
Status: DISCONTINUED | OUTPATIENT
Start: 2025-01-01 | End: 2025-01-01 | Stop reason: HOSPADM

## 2025-01-01 RX ADMIN — CHLORHEXIDINE GLUCONATE 0.12% ORAL RINSE 15 ML: 1.2 LIQUID ORAL at 10:03

## 2025-01-01 RX ADMIN — SODIUM CHLORIDE 1000 ML: 9 INJECTION, SOLUTION INTRAVENOUS at 08:03

## 2025-01-01 RX ADMIN — CALCIUM CHLORIDE 1000 MG: 100 INJECTION, SOLUTION INTRAVENOUS at 05:03

## 2025-01-01 RX ADMIN — SODIUM BICARBONATE: 84 INJECTION, SOLUTION INTRAVENOUS at 04:03

## 2025-01-01 RX ADMIN — VASOPRESSIN 0.04 UNITS/MIN: 0.2 INJECTION INTRAVENOUS at 11:03

## 2025-01-01 RX ADMIN — EPINEPHRINE 1 MG: 0.1 INJECTION INTRACARDIAC; INTRAVENOUS at 05:03

## 2025-01-01 RX ADMIN — NOREPINEPHRINE BITARTRATE 1 MCG/KG/MIN: 0.02 INJECTION, SOLUTION INTRAVENOUS at 03:03

## 2025-01-01 RX ADMIN — RIFAXIMIN 550 MG: 550 TABLET ORAL at 10:03

## 2025-01-01 RX ADMIN — HYDROCORTISONE SODIUM SUCCINATE 100 MG: 100 INJECTION, POWDER, FOR SOLUTION INTRAMUSCULAR; INTRAVENOUS at 04:03

## 2025-01-01 RX ADMIN — DEXTROSE MONOHYDRATE: 100 INJECTION, SOLUTION INTRAVENOUS at 08:03

## 2025-01-01 RX ADMIN — NOREPINEPHRINE BITARTRATE 0.05 MCG/KG/MIN: 0.02 INJECTION, SOLUTION INTRAVENOUS at 07:03

## 2025-01-01 RX ADMIN — PIPERACILLIN SODIUM AND TAZOBACTAM SODIUM 4.5 G: 4; .5 INJECTION, POWDER, FOR SOLUTION INTRAVENOUS at 03:03

## 2025-01-01 RX ADMIN — THIAMINE HYDROCHLORIDE 400 MG: 100 INJECTION, SOLUTION INTRAMUSCULAR; INTRAVENOUS at 08:03

## 2025-01-01 RX ADMIN — DEXTROSE MONOHYDRATE: 100 INJECTION, SOLUTION INTRAVENOUS at 10:03

## 2025-01-01 RX ADMIN — NOREPINEPHRINE BITARTRATE 0.2 MCG/KG/MIN: 0.02 INJECTION, SOLUTION INTRAVENOUS at 12:03

## 2025-01-01 RX ADMIN — NOREPINEPHRINE BITARTRATE 4 MG: 0.02 INJECTION, SOLUTION INTRAVENOUS at 05:03

## 2025-01-01 RX ADMIN — NOREPINEPHRINE BITARTRATE 0.6 MCG/KG/MIN: 0.02 INJECTION, SOLUTION INTRAVENOUS at 02:03

## 2025-01-01 RX ADMIN — ROCURONIUM BROMIDE 80 MG: 10 INJECTION, SOLUTION INTRAVENOUS at 02:03

## 2025-01-01 RX ADMIN — LACTULOSE 30 G: 20 SOLUTION ORAL at 10:03

## 2025-01-01 RX ADMIN — EPINEPHRINE 0.02 MCG/KG/MIN: 1 INJECTION INTRAMUSCULAR; INTRAVENOUS; SUBCUTANEOUS at 03:03

## 2025-01-01 RX ADMIN — VANCOMYCIN HYDROCHLORIDE 1500 MG: 1.5 INJECTION, POWDER, LYOPHILIZED, FOR SOLUTION INTRAVENOUS at 10:03

## 2025-01-01 RX ADMIN — ETOMIDATE 20 MG: 2 INJECTION INTRAVENOUS at 07:03

## 2025-01-01 RX ADMIN — VASOPRESSIN 0.04 UNITS/MIN: 0.2 INJECTION INTRAVENOUS at 04:03

## 2025-01-01 RX ADMIN — DEXTROSE MONOHYDRATE 25 G: 25 INJECTION, SOLUTION INTRAVENOUS at 03:03

## 2025-01-01 RX ADMIN — POTASSIUM CHLORIDE 40 MEQ: 29.8 INJECTION, SOLUTION INTRAVENOUS at 01:03

## 2025-01-01 RX ADMIN — SODIUM CHLORIDE 2085 ML: 9 INJECTION, SOLUTION INTRAVENOUS at 03:03

## 2025-01-01 RX ADMIN — HYDROCORTISONE SODIUM SUCCINATE 200 MG: 100 INJECTION, POWDER, FOR SOLUTION INTRAMUSCULAR; INTRAVENOUS at 10:03

## 2025-01-01 RX ADMIN — ALBUMIN (HUMAN) 25 G: 5 SOLUTION INTRAVENOUS at 11:03

## 2025-01-01 RX ADMIN — SODIUM BICARBONATE: 84 INJECTION, SOLUTION INTRAVENOUS at 10:03

## 2025-01-01 RX ADMIN — NOREPINEPHRINE BITARTRATE 1 MCG/KG/MIN: 0.02 INJECTION, SOLUTION INTRAVENOUS at 04:03

## 2025-01-01 RX ADMIN — PIPERACILLIN SODIUM AND TAZOBACTAM SODIUM 4.5 G: 4; .5 INJECTION, POWDER, FOR SOLUTION INTRAVENOUS at 07:03

## 2025-01-01 RX ADMIN — CALCIUM CHLORIDE 1 G: 100 INJECTION, SOLUTION INTRAVENOUS at 04:03

## 2025-01-01 RX ADMIN — IPRATROPIUM BROMIDE AND ALBUTEROL SULFATE 3 ML: 2.5; .5 SOLUTION RESPIRATORY (INHALATION) at 01:03

## 2025-01-01 RX ADMIN — ROCURONIUM BROMIDE 80 MG: 10 INJECTION INTRAVENOUS at 07:03

## 2025-01-01 RX ADMIN — CALCIUM CHLORIDE 1 G: 100 INJECTION, SOLUTION INTRAVENOUS at 05:03

## 2025-01-01 RX ADMIN — SODIUM BICARBONATE 50 MEQ: 84 INJECTION INTRAVENOUS at 05:03

## 2025-01-01 RX ADMIN — ALBUMIN (HUMAN) 25 G: 5 SOLUTION INTRAVENOUS at 04:03

## 2025-01-01 RX ADMIN — SODIUM BICARBONATE 100 MEQ: 84 INJECTION, SOLUTION INTRAVENOUS at 04:03

## 2025-03-15 ENCOUNTER — HOSPITAL ENCOUNTER (EMERGENCY)
Facility: HOSPITAL | Age: 70
Discharge: HOME | End: 2025-03-15
Attending: EMERGENCY MEDICINE
Payer: MEDICARE

## 2025-03-15 VITALS
TEMPERATURE: 98 F | RESPIRATION RATE: 16 BRPM | WEIGHT: 187 LBS | HEIGHT: 67 IN | OXYGEN SATURATION: 99 % | HEART RATE: 81 BPM | SYSTOLIC BLOOD PRESSURE: 156 MMHG | DIASTOLIC BLOOD PRESSURE: 89 MMHG | BODY MASS INDEX: 29.35 KG/M2

## 2025-03-15 DIAGNOSIS — D3A.8 NEUROENDOCRINE TUMOR OF PANCREAS: ICD-10-CM

## 2025-03-15 DIAGNOSIS — R60.0 PERIPHERAL EDEMA: ICD-10-CM

## 2025-03-15 DIAGNOSIS — R06.02 SHORTNESS OF BREATH: ICD-10-CM

## 2025-03-15 DIAGNOSIS — C22.0 HEPATOCELLULAR CARCINOMA: ICD-10-CM

## 2025-03-15 DIAGNOSIS — N50.89 SCROTAL EDEMA: Primary | ICD-10-CM

## 2025-03-15 LAB
ALBUMIN SERPL BCP-MCNC: 3.4 G/DL (ref 3.5–5.2)
ALP SERPL-CCNC: 114 U/L (ref 40–150)
ALT SERPL W/O P-5'-P-CCNC: 57 U/L (ref 10–44)
ANION GAP SERPL CALC-SCNC: 10 MMOL/L (ref 8–16)
AST SERPL-CCNC: 132 U/L (ref 10–40)
BASOPHILS # BLD AUTO: 0.04 K/UL (ref 0–0.2)
BASOPHILS NFR BLD: 0.6 % (ref 0–1.9)
BILIRUB SERPL-MCNC: 2.9 MG/DL (ref 0.1–1)
BNP SERPL-MCNC: 30 PG/ML (ref 0–99)
BUN SERPL-MCNC: 15 MG/DL (ref 8–23)
CALCIUM SERPL-MCNC: 9 MG/DL (ref 8.7–10.5)
CHLORIDE SERPL-SCNC: 108 MMOL/L (ref 95–110)
CO2 SERPL-SCNC: 21 MMOL/L (ref 23–29)
CREAT SERPL-MCNC: 0.9 MG/DL (ref 0.5–1.4)
DIFFERENTIAL METHOD BLD: ABNORMAL
EOSINOPHIL # BLD AUTO: 0.3 K/UL (ref 0–0.5)
EOSINOPHIL NFR BLD: 4.6 % (ref 0–8)
ERYTHROCYTE [DISTWIDTH] IN BLOOD BY AUTOMATED COUNT: 14.6 % (ref 11.5–14.5)
EST. GFR  (NO RACE VARIABLE): >60 ML/MIN/1.73 M^2
GLUCOSE SERPL-MCNC: 105 MG/DL (ref 70–110)
HCT VFR BLD AUTO: 46.4 % (ref 40–54)
HCV AB SERPL QL IA: POSITIVE
HEP C VIRUS HOLD SPECIMEN: NORMAL
HGB BLD-MCNC: 15.9 G/DL (ref 14–18)
HIV 1+2 AB+HIV1 P24 AG SERPL QL IA: NEGATIVE
IMM GRANULOCYTES # BLD AUTO: 0.02 K/UL (ref 0–0.04)
IMM GRANULOCYTES NFR BLD AUTO: 0.3 % (ref 0–0.5)
LYMPHOCYTES # BLD AUTO: 1.9 K/UL (ref 1–4.8)
LYMPHOCYTES NFR BLD: 28.3 % (ref 18–48)
MCH RBC QN AUTO: 31.1 PG (ref 27–31)
MCHC RBC AUTO-ENTMCNC: 34.3 G/DL (ref 32–36)
MCV RBC AUTO: 91 FL (ref 82–98)
MONOCYTES # BLD AUTO: 1.1 K/UL (ref 0.3–1)
MONOCYTES NFR BLD: 15.5 % (ref 4–15)
NEUTROPHILS # BLD AUTO: 3.5 K/UL (ref 1.8–7.7)
NEUTROPHILS NFR BLD: 50.7 % (ref 38–73)
NRBC BLD-RTO: 0 /100 WBC
PLATELET # BLD AUTO: 144 K/UL (ref 150–450)
PMV BLD AUTO: 9.2 FL (ref 9.2–12.9)
POTASSIUM SERPL-SCNC: 3.5 MMOL/L (ref 3.5–5.1)
PROT SERPL-MCNC: 5.5 G/DL (ref 6–8.4)
RBC # BLD AUTO: 5.12 M/UL (ref 4.6–6.2)
SODIUM SERPL-SCNC: 139 MMOL/L (ref 136–145)
TROPONIN I SERPL DL<=0.01 NG/ML-MCNC: <0.006 NG/ML (ref 0–0.03)
WBC # BLD AUTO: 6.79 K/UL (ref 3.9–12.7)

## 2025-03-15 PROCEDURE — 93005 ELECTROCARDIOGRAM TRACING: CPT

## 2025-03-15 PROCEDURE — 99285 EMERGENCY DEPT VISIT HI MDM: CPT | Mod: 25

## 2025-03-15 PROCEDURE — 83880 ASSAY OF NATRIURETIC PEPTIDE: CPT | Performed by: NURSE PRACTITIONER

## 2025-03-15 PROCEDURE — 84484 ASSAY OF TROPONIN QUANT: CPT | Performed by: NURSE PRACTITIONER

## 2025-03-15 PROCEDURE — 87389 HIV-1 AG W/HIV-1&-2 AB AG IA: CPT | Performed by: EMERGENCY MEDICINE

## 2025-03-15 PROCEDURE — 80053 COMPREHEN METABOLIC PANEL: CPT | Performed by: NURSE PRACTITIONER

## 2025-03-15 PROCEDURE — 86803 HEPATITIS C AB TEST: CPT | Performed by: EMERGENCY MEDICINE

## 2025-03-15 PROCEDURE — 93010 ELECTROCARDIOGRAM REPORT: CPT | Mod: ,,, | Performed by: INTERNAL MEDICINE

## 2025-03-15 PROCEDURE — 85025 COMPLETE CBC W/AUTO DIFF WBC: CPT | Performed by: NURSE PRACTITIONER

## 2025-03-15 RX ORDER — POTASSIUM CHLORIDE 750 MG/1
10 TABLET, EXTENDED RELEASE ORAL 2 TIMES DAILY
Qty: 30 TABLET | Refills: 0 | Status: SHIPPED | OUTPATIENT
Start: 2025-03-15 | End: 2025-03-30

## 2025-03-15 RX ORDER — FUROSEMIDE 20 MG/1
20 TABLET ORAL EVERY 12 HOURS PRN
Qty: 30 TABLET | Refills: 0 | Status: SHIPPED | OUTPATIENT
Start: 2025-03-15 | End: 2025-03-30

## 2025-03-15 NOTE — ED PROVIDER NOTES
SCRIBE #1 NOTE: I, Jayme Munoz and Joana Funez, am scribing for, and in the presence of, Aldair Pierson Jr., MD. I have scribed the entire note.       History     Chief Complaint   Patient presents with    Leg Swelling     Bilateral leg swelling that extends into groin area. Denies swelling to abd area. Hx of liver cancer and last chemo treatment was 3 weeks ago. Pt also c/o swelling to right eyelid.      Review of patient's allergies indicates:  No Known Allergies      History of Present Illness     HPI    3/15/2025, 2:00 PM  History obtained from the patient and medical records      History of Present Illness: Deven Jaramillo is a 70 y.o. male patient with a PMHx of hepatic cancer, malignant neoplasm of liver, pancreas mass, and HTN who presents to the Emergency Department for evaluation of BLE swelling which began 5 days ago. Symptoms are constant and moderate in severity. No mitigating or exacerbating factors reported. Associated sxs include scrotal swelling and R eyelid swelling. Patient denies any fever, abd distention, abd pain, N/V, and all other sxs at this time. No prior Tx specified.  No further complaints or concerns at this time.       Arrival mode: Personal Transportation    PCP: Alina Jane NP        Past Medical History:  No past medical history on file.    Past Surgical History:  No past surgical history on file.      Family History:  No family history on file.    Social History:  Social History     Tobacco Use    Smoking status: Not on file    Smokeless tobacco: Not on file   Substance and Sexual Activity    Alcohol use: Not on file    Drug use: Not on file    Sexual activity: Not on file        Review of Systems     Review of Systems   Constitutional:  Negative for chills, diaphoresis and fever.   HENT:  Negative for congestion.    Eyes:         (+) R eyelid swelling   Respiratory:  Negative for cough and shortness of breath.    Cardiovascular:  Positive for leg swelling (BLE).  "Negative for chest pain.   Gastrointestinal:  Negative for abdominal distention, abdominal pain, diarrhea, nausea and vomiting.   Genitourinary:  Positive for scrotal swelling. Negative for dysuria.   Musculoskeletal:  Negative for back pain.   Skin:  Negative for rash.   Neurological:  Negative for dizziness, weakness and headaches.   All other systems reviewed and are negative.       Physical Exam     Initial Vitals [03/15/25 1257]   BP Pulse Resp Temp SpO2   (!) 159/87 80 20 97.9 °F (36.6 °C) 97 %      MAP       --          Physical Exam  Nursing Notes and Vital Signs Reviewed.  Constitutional: Patient is in no acute distress. Well-developed and well-nourished.  Head: Atraumatic. Normocephalic.  Eyes: EOM intact. Conjunctivae are not pale. No scleral icterus.  ENT: Mucous membranes are moist.   Neck: Supple. Full ROM.  Cardiovascular: Regular rate. Regular rhythm. No murmurs, rubs, or gallops.  Pulmonary/Chest: No respiratory distress. Clear to auscultation bilaterally. No wheezing or rales.  Abdominal: Soft and non-distended.  There is no tenderness.  No rebound, guarding, or rigidity.  : No penile discharge. 2+ edema to scrotum.  Musculoskeletal: Moves all extremities. No obvious deformities. BLE 2+ edema.  Skin: Warm and dry.  Neurological:  Alert, awake, and appropriate.  Normal speech.  No acute focal neurological deficits are appreciated.  Psychiatric: Normal affect. Good eye contact. Appropriate in content.     ED Course   Procedures  ED Vital Signs:  Vitals:    03/15/25 1257 03/15/25 1438   BP: (!) 159/87 (!) 156/89   Pulse: 80 81   Resp: 20 16   Temp: 97.9 °F (36.6 °C)    TempSrc: Oral    SpO2: 97% 99%   Weight: 84.8 kg (187 lb)    Height: 5' 7" (1.702 m)        Abnormal Lab Results:  Labs Reviewed   CBC W/ AUTO DIFFERENTIAL - Abnormal       Result Value    WBC 6.79      RBC 5.12      Hemoglobin 15.9      Hematocrit 46.4      MCV 91      MCH 31.1 (*)     MCHC 34.3      RDW 14.6 (*)     Platelets 144 " (*)     MPV 9.2      Immature Granulocytes 0.3      Gran # (ANC) 3.5      Immature Grans (Abs) 0.02      Lymph # 1.9      Mono # 1.1 (*)     Eos # 0.3      Baso # 0.04      nRBC 0      Gran % 50.7      Lymph % 28.3      Mono % 15.5 (*)     Eosinophil % 4.6      Basophil % 0.6      Differential Method Automated      Narrative:     Release to patient->Immediate   COMPREHENSIVE METABOLIC PANEL - Abnormal    Sodium 139      Potassium 3.5      Chloride 108      CO2 21 (*)     Glucose 105      BUN 15      Creatinine 0.9      Calcium 9.0      Total Protein 5.5 (*)     Albumin 3.4 (*)     Total Bilirubin 2.9 (*)     Alkaline Phosphatase 114       (*)     ALT 57 (*)     eGFR >60      Anion Gap 10      Narrative:     Release to patient->Immediate   HEP C VIRUS HOLD SPECIMEN    HEP C Virus Hold Specimen Hold for HCV sendout      Narrative:     Release to patient->Immediate   TROPONIN I    Troponin I <0.006      Narrative:     Release to patient->Immediate   B-TYPE NATRIURETIC PEPTIDE    BNP 30      Narrative:     Release to patient->Immediate   HEPATITIS C ANTIBODY   HIV 1 / 2 ANTIBODY        All Lab Results:  Results for orders placed or performed during the hospital encounter of 03/15/25   HCV Virus Hold Specimen    Collection Time: 03/15/25  1:35 PM   Result Value Ref Range    HEP C Virus Hold Specimen Hold for HCV sendout    CBC auto differential    Collection Time: 03/15/25  1:35 PM   Result Value Ref Range    WBC 6.79 3.90 - 12.70 K/uL    RBC 5.12 4.60 - 6.20 M/uL    Hemoglobin 15.9 14.0 - 18.0 g/dL    Hematocrit 46.4 40.0 - 54.0 %    MCV 91 82 - 98 fL    MCH 31.1 (H) 27.0 - 31.0 pg    MCHC 34.3 32.0 - 36.0 g/dL    RDW 14.6 (H) 11.5 - 14.5 %    Platelets 144 (L) 150 - 450 K/uL    MPV 9.2 9.2 - 12.9 fL    Immature Granulocytes 0.3 0.0 - 0.5 %    Gran # (ANC) 3.5 1.8 - 7.7 K/uL    Immature Grans (Abs) 0.02 0.00 - 0.04 K/uL    Lymph # 1.9 1.0 - 4.8 K/uL    Mono # 1.1 (H) 0.3 - 1.0 K/uL    Eos # 0.3 0.0 - 0.5 K/uL     Baso # 0.04 0.00 - 0.20 K/uL    nRBC 0 0 /100 WBC    Gran % 50.7 38.0 - 73.0 %    Lymph % 28.3 18.0 - 48.0 %    Mono % 15.5 (H) 4.0 - 15.0 %    Eosinophil % 4.6 0.0 - 8.0 %    Basophil % 0.6 0.0 - 1.9 %    Differential Method Automated    Comprehensive metabolic panel    Collection Time: 03/15/25  1:35 PM   Result Value Ref Range    Sodium 139 136 - 145 mmol/L    Potassium 3.5 3.5 - 5.1 mmol/L    Chloride 108 95 - 110 mmol/L    CO2 21 (L) 23 - 29 mmol/L    Glucose 105 70 - 110 mg/dL    BUN 15 8 - 23 mg/dL    Creatinine 0.9 0.5 - 1.4 mg/dL    Calcium 9.0 8.7 - 10.5 mg/dL    Total Protein 5.5 (L) 6.0 - 8.4 g/dL    Albumin 3.4 (L) 3.5 - 5.2 g/dL    Total Bilirubin 2.9 (H) 0.1 - 1.0 mg/dL    Alkaline Phosphatase 114 40 - 150 U/L     (H) 10 - 40 U/L    ALT 57 (H) 10 - 44 U/L    eGFR >60 >60 mL/min/1.73 m^2    Anion Gap 10 8 - 16 mmol/L   Troponin I    Collection Time: 03/15/25  1:35 PM   Result Value Ref Range    Troponin I <0.006 0.000 - 0.026 ng/mL   Brain natriuretic peptide    Collection Time: 03/15/25  1:35 PM   Result Value Ref Range    BNP 30 0 - 99 pg/mL       Imaging Results:  Imaging Results              X-Ray Chest AP Portable (Final result)  Result time 03/15/25 13:41:24      Final result by Mary Anne Solorio MD (03/15/25 13:41:24)                   Impression:      No radiographic evidence of an acute cardiopulmonary abnormality.    Finalized on: 3/15/2025 1:41 PM By:  Mary nAne Solorio MD  [facility]# [id number]      2025-03-15 13:43:[id number]     [facility]               Narrative:    PROCEDURE:  XR CHEST AP PORTABLE    INDICATION:  CHF    TECHNIQUE:  AP view of the chest.    COMPARISON:  There are no prior studies available for direct comparison.      FINDINGS:  No focal consolidation, significant pleural effusion or pneumothorax.  Heart is upper limits of normal in size.  No acute osseous abnormality.                                         The EKG was ordered, reviewed, and independently interpreted by the ED  provider.  Interpretation time: 1342  Rate: 79 BPM  Rhythm: normal sinus rhythm  Interpretation: Left anterior fascicular block. T WA, consider anterior ischemia. No STEMI.             The Emergency Provider reviewed the vital signs and test results, which are outlined above.     ED Discussion     2:54 PM: Reassessed pt at this time. Discussed with patient and/or family/caretaker all pertinent ED information and results. Discussed pt dx and plan of tx. Gave the patient all f/u and return to the ED instructions. All questions and concerns were addressed at this time. Patient and/or family/caretaker expresses understanding of information and instructions, and is comfortable with plan to discharge. Pt is stable for discharge.     I discussed with patient and/or family/caretaker that evaluation in the ED does not suggest any emergent or life threatening medical conditions requiring immediate intervention beyond what was provided in the ED, and I believe patient is safe for discharge.  Regardless, an unremarkable evaluation in the ED does not preclude the development or presence of a serious of life threatening condition. As such, I instructed that the patient is to return immediately for any worsening or change in current symptoms.       Medical Decision Making  Amount and/or Complexity of Data Reviewed  Labs: ordered. Decision-making details documented in ED Course.  Radiology: ordered. Decision-making details documented in ED Course.  ECG/medicine tests: ordered and independent interpretation performed. Decision-making details documented in ED Course.    Risk  Prescription drug management.                ED Medication(s):  Medications - No data to display    New Prescriptions    No medications on file        Follow-up Information       Alina Jane, NP. Call in 2 days.    Specialty: Family Medicine  Why: Call to schedule appt to see Dr Jane this week for recheck and regarding ongoing treatment of leg and  scrotal swelling  Contact information:  [address]  [place] [address]  [phone]               Nieves Martinez MD. Call in 2 days.    Specialty: Hematology  Why: to schedule appt regarding ongoing  treatment of  leg and scrotal edema  related to you liver cancer  Contact information:  [address], [address]  [place] [address]  [phone]                                 Scribe Attestation:   Scribe #1: I performed the above scribed service and the documentation accurately describes the services I performed. I attest to the accuracy of the note.     Attending:   Physician Attestation Statement for Scribe #1: I, Aldair Pierson Jr., MD, personally performed the services described in this documentation, as scribed by Jayme Munoz and Joana Funez, in my presence, and it is both accurate and complete.           Clinical Impression       ICD-10-CM ICD-9-CM   1. Scrotal edema  N50.89 608.86   2. Shortness of breath  R06.02 786.05   3. Peripheral edema  R60.0 782.3   4. Hepatocellular carcinoma  C22.0 155.0   5. Neuroendocrine tumor of pancreas  D3A.8 209.69       Disposition:   Disposition: Discharged  Condition: Stable       Aldair Pierson Jr., MD  03/15/25 3401

## 2025-03-15 NOTE — DISCHARGE INSTRUCTIONS
Low salt diet     Take K dur and lasix daily as directed until seen by Dr Bacon / NP Buck    Use underwear briefs vs boxer briefs to reduce dependant edema of your scrotum

## 2025-03-15 NOTE — FIRST PROVIDER EVALUATION
"Medical screening examination initiated.  I have conducted a focused provider triage encounter, findings are as follows:    Brief history of present illness:  70-year-old presents to ER complaining of bilateral lower extremity swelling for 5 days.  Denies chest pain or shortness of breath.  Reports no relief with diuretics.    Vitals:    03/15/25 1257   BP: (!) 159/87   BP Location: Right arm   Pulse: 80   Resp: 20   Temp: 97.9 °F (36.6 °C)   TempSrc: Oral   SpO2: 97%   Weight: 84.8 kg (187 lb)   Height: 5' 7" (1.702 m)       Pertinent physical exam:  Pitting edema bilateral lower extremities    Brief workup plan:  Cardiac workup, further evaluation    Preliminary workup initiated; this workup will be continued and followed by the physician or advanced practice provider that is assigned to the patient when roomed.  "

## 2025-03-16 LAB
OHS QRS DURATION: 94 MS
OHS QTC CALCULATION: 454 MS

## 2025-03-27 PROBLEM — K72.01 ACUTE LIVER FAILURE WITH HEPATIC COMA: Status: ACTIVE | Noted: 2025-01-01

## 2025-03-27 PROBLEM — J96.01 ACUTE HYPOXEMIC RESPIRATORY FAILURE: Status: ACTIVE | Noted: 2025-01-01

## 2025-03-27 PROBLEM — E16.2 HYPOGLYCEMIA: Status: ACTIVE | Noted: 2025-01-01

## 2025-03-27 PROBLEM — A41.9 SEPSIS WITH ACUTE ORGAN DYSFUNCTION: Status: ACTIVE | Noted: 2025-01-01

## 2025-03-27 PROBLEM — Z99.11 ON MECHANICALLY ASSISTED VENTILATION: Status: ACTIVE | Noted: 2025-01-01

## 2025-03-27 PROBLEM — N17.9 AKI (ACUTE KIDNEY INJURY): Status: ACTIVE | Noted: 2025-01-01

## 2025-03-27 PROBLEM — R65.20 SEPSIS WITH ACUTE ORGAN DYSFUNCTION: Status: ACTIVE | Noted: 2025-01-01

## 2025-03-27 PROBLEM — G93.41 ACUTE METABOLIC ENCEPHALOPATHY: Status: ACTIVE | Noted: 2025-01-01

## 2025-03-27 NOTE — ED PROVIDER NOTES
SCRIBE #1 NOTE: I, Jose Rain, am scribing for, and in the presence of, Sherin Florentino DO. I have scribed the entire note.       History     Chief Complaint   Patient presents with    Altered Mental Status     Pt brought in by EMS from [facility] for AMS and lethargy. Pt was found to have a CBG of 11 on scene which was improved to 99 prior to arrival. Pt reports he has not been eating for several days due to diarrhea and abdominal pain. Pt has been not speaking recently and very lethargic and weak over the past week per family. Pt has a history of liver cancer.     Review of patient's allergies indicates:  No Known Allergies      History of Present Illness     HPI    3/27/2025, 3:14 PM  History obtained from the patient, medical records, and daughter      History of Present Illness: Deven Jaramillo is a 70 y.o. male patient with a PMHx of liver cancer and HTN who presents to the Emergency Department for evaluation of AMS which was observed this AM. Pt has not been speaking and confused. Pt's daughter states he's been confused and eating less the last few days. Pt has also had slurred speech but family assumed this was due to him not having his dentures in. Pt had a PCP at noon today; pt's son noticed that he appeared confused. Pt's daughter denies any O2 at home. Pt's last chemo dosage was at an unknown date. Symptoms are constant and moderate in severity. No mitigating or exacerbating factors reported.  A full ROS cannot be conducted secondary to pt AMS. No prior Tx specified.  No further complaints or concerns at this time.       Arrival mode: Ambulance Service    PCP: Alina Jane NP        Past Medical History:  Past Medical History:   Diagnosis Date    Anasarca     Hepatocellular carcinoma     HLD (hyperlipidemia)     Hypertension     Immunosuppression due to drug therapy     Pancreatic cancer     Primary neuroendocrine tumor of pancreatic tail    Unspecified viral hepatitis C without  hepatic coma        Past Surgical History:  Past Surgical History:   Procedure Laterality Date    CT guided biopsy of pancreas           Family History:  No family history on file.    Social History:  Social History     Tobacco Use    Smoking status: Every Day     Current packs/day: 1.00     Average packs/day: 1 pack/day for 50.0 years (50.0 ttl pk-yrs)     Types: Cigarettes     Start date: 3/27/1975    Smokeless tobacco: Never   Substance and Sexual Activity    Alcohol use: Not Currently     Alcohol/week: 28.0 standard drinks of alcohol     Types: 28 Cans of beer per week    Drug use: Never    Sexual activity: Not Currently        Review of Systems     Review of Systems   Reason unable to perform ROS: Pt is AMS.   Constitutional:  Positive for appetite change.   Gastrointestinal:  Positive for diarrhea.   Neurological:  Positive for speech difficulty.   Psychiatric/Behavioral:  Positive for confusion.         Physical Exam     Initial Vitals   BP Pulse Resp Temp SpO2   03/27/25 1418 03/27/25 1418 03/27/25 1418 03/27/25 2219 03/27/25 1418   136/71 97 (!) 24 (!) 89.2 °F (31.8 °C) (!) 94 %      MAP       --                 Physical Exam  Nursing Notes and Vital Signs Reviewed.  Constitutional: Patient is in no acute distress. Frail and ill-appearing.  Cachectic.  Head: Atraumatic. Normocephalic.  Eyes: PERRL. Scleral icterus.  ENT: Mucous membranes are dry.  Edentulous.    Cardiovascular: Regular rate. Regular rhythm. No murmurs, rubs, or gallops.  Pulmonary/Chest: No respiratory distress. Clear to auscultation bilaterally. No wheezing or rales.  Abdominal: Distended abdomen, diffuse tenderness.  No rebound, guarding, or rigidity. Good bowel sounds.  Musculoskeletal: Edema BLE, 2+ left, 1+ right. No calf tenderness.  Skin: Warm and dry.  Neurological:  Alert, awake, and appropriate.  Nonverbal.  No acute focal neurological deficits are appreciated.     ED Course   Critical Care    Date/Time: 3/27/2025 5:18  PM    Performed by: Sherin Florentino DO  Authorized by: Sherin Florentino DO  Direct patient critical care time: 28 minutes  Additional history critical care time: 9 minutes  Ordering / reviewing critical care time: 8 minutes  Documentation critical care time: 7 minutes  Consulting other physicians critical care time: 6 minutes  Consult with family critical care time: 5 minutes  Total critical care time (exclusive of procedural time) : 63 minutes  Critical care time was exclusive of separately billable procedures and treating other patients and teaching time.  Critical care was necessary to treat or prevent imminent or life-threatening deterioration of the following conditions: metabolic crisis.  Critical care was time spent personally by me on the following activities: blood draw for specimens, development of treatment plan with patient or surrogate, discussions with consultants, evaluation of patient's response to treatment, examination of patient, obtaining history from patient or surrogate, ordering and performing treatments and interventions, ordering and review of laboratory studies, pulse oximetry, ordering and review of radiographic studies and re-evaluation of patient's condition.      Intubation    Date/Time: 3/27/2025 7:48 PM  Location procedure was performed: [facility] EMERGENCY DEPARTMENT    Performed by: Sherin Florentino DO  Authorized by: Sherin Florentino DO  Consent Done: Emergent Situation  Indications: airway protection and respiratory distress  Intubation method: video-assisted  Patient status: paralyzed (RSI)  Preoxygenation: BVM  Pretreatment medications: none  Sedatives: etomidate  Paralytic: rocuronium  Laryngoscope size: Mac 4  Tube size: 7.5 mm  Tube type: cuffed  Number of attempts: 3  Ventilation between attempts: BVM  Cricoid pressure: no  Cords visualized: yes  Post-procedure assessment: chest rise, CO2 detector and esophageal detector  Breath sounds: equal and absent over the  epigastrium  Cuff inflated: yes  ETT to lip: 24 cm  Tube secured with: ETT pascual  Chest x-ray interpreted by me and radiologist.  Chest x-ray findings: endotracheal tube too low  Tube repositioned: tube repositioned successfully  Complications: Yes (emesis)        ED Vital Signs:  Vitals:    03/27/25 2300 03/27/25 2315 03/27/25 2345 03/28/25 0000   BP: (!) 91/58 97/62  119/72   Pulse: 67   73   Resp: 18   (!) 24   Temp: (!) 88.5 °F (31.4 °C)  (!) 88.3 °F (31.3 °C) (!) 88.5 °F (31.4 °C)   TempSrc:   Core Esophageal    SpO2: (!) 91%   (!) 91%   Weight:        03/28/25 0045 03/28/25 0102 03/28/25 0114 03/28/25 0136   BP: (!) 93/58 (!) 92/56     Pulse: 75 77  80   Resp: (!) 24 (!) 24  (!) 24   Temp: (!) 89.6 °F (32 °C) (!) 89.8 °F (32.1 °C) (!) 90 °F (32.2 °C) (!) 90.5 °F (32.5 °C)   TempSrc:   Core Esophageal    SpO2: (!) 89% (!) 90%  (!) 74%   Weight:        03/28/25 0200 03/28/25 0203 03/28/25 0300 03/28/25 0355   BP: 94/64  90/60    Pulse: 83  91 96   Resp: (!) 24  (!) 24 (!) 24   Temp: (!) 91 °F (32.8 °C)  (!) 92.3 °F (33.5 °C)    TempSrc: Core Esophageal      SpO2: (!) 78% (S) (!) 76% (!) 89% (!) 90%   Weight:        03/28/25 0400 03/28/25 0500 03/28/25 0515   BP: 91/64 (!) 86/57 (!) 54/34   Pulse: 96 104 106   Resp: (!) 24 (!) 24 (!) 24   Temp: (!) 93.4 °F (34.1 °C) (!) 93.9 °F (34.4 °C) (!) 93.9 °F (34.4 °C)   TempSrc: Core Esophageal Core Esophageal Core Esophageal   SpO2: (!) 93% (!) 81% 100%   Weight:          Abnormal Lab Results:  Labs Reviewed   APTT - Abnormal       Result Value    PTT 49.2 (*)    B-TYPE NATRIURETIC PEPTIDE - Abnormal     (*)    COMPREHENSIVE METABOLIC PANEL - Abnormal    Sodium 141      Potassium 3.3 (*)     Chloride 105      CO2 6 (*)     Glucose 33 (*)     BUN 58 (*)     Creatinine 2.7 (*)     Calcium 8.6 (*)     Protein Total 4.7 (*)     Albumin 2.0 (*)     Bilirubin Total 4.5 (*)     ALP 60       (*)     ALT 79 (*)     Anion Gap 30 (*)     eGFR 25 (*)    LACTIC ACID,  PLASMA - Abnormal    Lactic Acid Level >12.0 (*)     Narrative:     Falsely low lactic acid results can be found in samples containing >=13.0 mg/dL total bilirubin and/or >=3.5 mg/dL direct bilirubin.    MAGNESIUM - Abnormal    Magnesium  3.7 (*)    PROTIME-INR - Abnormal    PT 19.6 (*)     INR 1.8 (*)    TROPONIN I - Abnormal    Troponin-I 0.065 (*)    TSH - Abnormal    TSH 10.782 (*)     Free T4 0.91     AMMONIA - Abnormal    Ammonia 167 (*)    CBC WITH DIFFERENTIAL - Abnormal    WBC 4.73      RBC 4.92      HGB 15.4      HCT 49.1       (*)     MCH 31.3      MCHC 31.4 (*)     RDW 15.4 (*)     Platelet Count 230      MPV 9.5      Nucleated RBC 2 (*)     Neut % 77.1 (*)     Lymph % 19.0      Mono % 2.7 (*)     Eos % 0.2      Basophil % 0.2      Imm Grans % 0.8 (*)     Neut # 3.64      Lymph # 0.90 (*)     Mono # 0.13 (*)     Eos # 0.01      Baso # 0.01      Imm Grans # 0.04     URINALYSIS, REFLEX TO URINE CULTURE - Abnormal    Color, UA Yellow      Appearance, UA Clear      pH, UA 6.0      Spec Grav UA 1.015      Protein, UA Trace (*)     Glucose, UA Negative      Ketones, UA Negative      Bilirubin, UA 1+ (*)     Blood, UA Negative      Nitrites, UA Negative      Urobilinogen, UA Negative      Leukocyte Esterase, UA Negative     CK - Abnormal    CPK 2,699 (*)    BASIC METABOLIC PANEL - Abnormal    Sodium 144      Potassium 4.9      Chloride 111 (*)     CO2 6 (*)     Glucose 64 (*)     BUN 53 (*)     Creatinine 2.6 (*)     Calcium 7.0 (*)     Anion Gap 27 (*)     eGFR 26 (*)    TROPONIN I - Abnormal    Troponin-I 0.054 (*)    LACTIC ACID, PLASMA - Abnormal    Lactic Acid Level >12.0 (*)     Narrative:     Falsely low lactic acid results can be found in samples containing >=13.0 mg/dL total bilirubin and/or >=3.5 mg/dL direct bilirubin.    PROCALCITONIN - Abnormal    Procalcitonin 7.52 (*)    HEPATIC FUNCTION PANEL - Abnormal    Protein Total 3.4 (*)     Albumin 1.4 (*)     Bilirubin Total 3.2 (*)     Bilirubin  Direct 2.5 (*)     ALP 51      AST 1,164 (*)      (*)    PROTIME-INR - Abnormal    PT 22.6 (*)     INR 2.1 (*)    POCT GLUCOSE - Abnormal    POCT Glucose 38 (*)    ISTAT PROCEDURE - Abnormal    POC PH 6.831 (*)     POC PCO2 47.8 (*)     POC PO2 114 (*)     POC HCO3 8.0 (*)     POC BE -26 (*)     POC SATURATED O2 92      POC Glucose 360 (*)     POC Sodium 134 (*)     POC Potassium 3.4 (*)     POC Ionized Calcium 1.03 (*)     POC Hematocrit 41      Sample ARTERIAL      Site LR      Allens Test Pass      DelSys NRB      Mode SPONT      Flow 15     POCT GLUCOSE - Abnormal    POCT Glucose 64 (*)    POCT GLUCOSE - Abnormal    POCT Glucose 69 (*)    INFLUENZA A & B BY MOLECULAR - Normal    INFLUENZA A MOLECULAR Negative      INFLUENZA B MOLECULAR  Negative     CLOSTRIDIUM DIFFICILE - Normal    C. DIFFICILE GDH AG Negative      Clostridium Difficile Toxin A/B Negative     LIPASE - Normal    Lipase Level 16     SARS-COV-2 RNA AMPLIFICATION, QUAL - Normal    SARS COV-2 Molecular Negative     LIPASE - Normal    Lipase Level 16     CULTURE, STOOL   CULTURE, RESPIRATORY   CULTURE, METHICILLIN-RESISTANT STAPHYLOCOCCUS AUREUS   CBC W/ AUTO DIFFERENTIAL    Narrative:     The following orders were created for panel order CBC auto differential.  Procedure                               Abnormality         Status                     ---------                               -----------         ------                     CBC with Differential[[id number]]       Abnormal            Final result                 Please view results for these tests on the individual orders.   LEGIONELLA ANTIGEN, URINE RANDOM   POCT GLUCOSE    POCT Glucose 85     POCT GLUCOSE    POCT Glucose 110     POCT GLUCOSE    POCT Glucose 105     POCT GLUCOSE    POCT Glucose 81     POCT GLUCOSE    POCT Glucose 78          All Lab Results:  Results for orders placed or performed during the hospital encounter of 03/27/25   POCT glucose    Collection Time: 03/27/25   2:13 PM   Result Value Ref Range    POCT Glucose 85 70 - 110 mg/dL   EKG 12-lead    Collection Time: 03/27/25  3:34 PM   Result Value Ref Range    QRS Duration 108 ms    [facility] QTC Calculation 512 ms   POCT glucose    Collection Time: 03/27/25  3:36 PM   Result Value Ref Range    POCT Glucose 38 (LL) 70 - 110 mg/dL   APTT    Collection Time: 03/27/25  3:39 PM   Result Value Ref Range    PTT 49.2 (H) 21.0 - 32.0 seconds   Brain natriuretic peptide    Collection Time: 03/27/25  3:39 PM   Result Value Ref Range     (H) 0 - 99 pg/mL   Comprehensive metabolic panel    Collection Time: 03/27/25  3:39 PM   Result Value Ref Range    Sodium 141 136 - 145 mmol/L    Potassium 3.3 (L) 3.5 - 5.1 mmol/L    Chloride 105 95 - 110 mmol/L    CO2 6 (LL) 23 - 29 mmol/L    Glucose 33 (LL) 70 - 110 mg/dL    BUN 58 (H) 8 - 23 mg/dL    Creatinine 2.7 (H) 0.5 - 1.4 mg/dL    Calcium 8.6 (L) 8.7 - 10.5 mg/dL    Protein Total 4.7 (L) 6.0 - 8.4 gm/dL    Albumin 2.0 (L) 3.5 - 5.2 g/dL    Bilirubin Total 4.5 (H) 0.1 - 1.0 mg/dL    ALP 60 40 - 150 unit/L     (H) 11 - 45 unit/L    ALT 79 (H) 10 - 44 unit/L    Anion Gap 30 (H) 8 - 16 mmol/L    eGFR 25 (L) >60 mL/min/1.73/m2   Lactic acid, plasma    Collection Time: 03/27/25  3:39 PM   Result Value Ref Range    Lactic Acid Level >12.0 (HH) 0.5 - 2.2 mmol/L   Lipase    Collection Time: 03/27/25  3:39 PM   Result Value Ref Range    Lipase Level 16 4 - 60 U/L   Magnesium    Collection Time: 03/27/25  3:39 PM   Result Value Ref Range    Magnesium  3.7 (H) 1.6 - 2.6 mg/dL   Protime-INR    Collection Time: 03/27/25  3:39 PM   Result Value Ref Range    PT 19.6 (H) 9.0 - 12.5 seconds    INR 1.8 (H) 0.8 - 1.2   Troponin I    Collection Time: 03/27/25  3:39 PM   Result Value Ref Range    Troponin-I 0.065 (H) <=0.026 ng/mL   TSH    Collection Time: 03/27/25  3:39 PM   Result Value Ref Range    TSH 10.782 (H) 0.400 - 4.000 uIU/mL    Free T4 0.91 0.71 - 1.51 ng/dL   Ammonia    Collection Time: 03/27/25   3:39 PM   Result Value Ref Range    Ammonia 167 (H) 10 - 50 umol/L   CBC with Differential    Collection Time: 03/27/25  3:39 PM   Result Value Ref Range    WBC 4.73 3.90 - 12.70 K/uL    RBC 4.92 4.60 - 6.20 M/uL    HGB 15.4 14.0 - 18.0 gm/dL    HCT 49.1 40.0 - 54.0 %     (H) 82 - 98 fL    MCH 31.3 27.0 - 50.0 pg    MCHC 31.4 (L) 32.0 - 36.0 g/dL    RDW 15.4 (H) 11.5 - 14.5 %    Platelet Count 230 150 - 450 K/uL    MPV 9.5 9.2 - 12.9 fL    Nucleated RBC 2 (H) <=0 /100 WBC    Neut % 77.1 (H) 38 - 73 %    Lymph % 19.0 18 - 48 %    Mono % 2.7 (L) 4 - 15 %    Eos % 0.2 <=8 %    Basophil % 0.2 <=1.9 %    Imm Grans % 0.8 (H) 0.0 - 0.5 %    Neut # 3.64 1.8 - 7.7 K/uL    Lymph # 0.90 (L) 1 - 4.8 K/uL    Mono # 0.13 (L) 0.3 - 1 K/uL    Eos # 0.01 <=0.5 K/uL    Baso # 0.01 <=0.2 K/uL    Imm Grans # 0.04 0.00 - 0.04 K/uL   CK    Collection Time: 03/27/25  3:39 PM   Result Value Ref Range    CPK 2,699 (H) 20 - 200 U/L   Lipase    Collection Time: 03/27/25  3:39 PM   Result Value Ref Range    Lipase Level 16 4 - 60 U/L   Influenza A & B by Molecular    Collection Time: 03/27/25  3:42 PM    Specimen: Nasal Swab   Result Value Ref Range    INFLUENZA A MOLECULAR Negative Negative    INFLUENZA B MOLECULAR  Negative Negative   COVID-19 Rapid Screening    Collection Time: 03/27/25  3:42 PM   Result Value Ref Range    SARS COV-2 Molecular Negative Negative   ISTAT PROCEDURE    Collection Time: 03/27/25  3:45 PM   Result Value Ref Range    POC PH 6.831 (LL) 7.35 - 7.45    POC PCO2 47.8 (H) 35 - 45 mmHg    POC PO2 114 (H) 80 - 100 mmHg    POC HCO3 8.0 (L) 24 - 28 mmol/L    POC BE -26 (L) -2 to 2 mmol/L    POC SATURATED O2 92 95 - 100 %    POC Glucose 360 (H) 70 - 110 mg/dL    POC Sodium 134 (L) 136 - 145 mmol/L    POC Potassium 3.4 (L) 3.5 - 5.1 mmol/L    POC Ionized Calcium 1.03 (L) 1.06 - 1.42 mmol/L    POC Hematocrit 41 36 - 54 %PCV    Sample ARTERIAL     Site LR     Allens Test Pass     DelSys NRB     Mode SPONT     Flow 15     Blood culture #1 **CANNOT BE ORDERED STAT**    Collection Time: 03/27/25  3:53 PM    Specimen: Peripheral, Antecubital, Left; Blood   Result Value Ref Range    GRAM STAIN Gram positive cocci in chains resembling Strep (AA)     GRAM STAIN Gram positive cocci in chains resembling Strep (AA)    Blood culture #2 **CANNOT BE ORDERED STAT**    Collection Time: 03/27/25  3:53 PM    Specimen: Peripheral, Antecubital, Right; Blood   Result Value Ref Range    GRAM STAIN Gram positive cocci in chains resembling Strep (AA)     GRAM STAIN Gram positive cocci in chains resembling Strep (AA)    POCT glucose    Collection Time: 03/27/25  4:00 PM   Result Value Ref Range    POCT Glucose 110 70 - 110 mg/dL   POCT glucose    Collection Time: 03/27/25  4:34 PM   Result Value Ref Range    POCT Glucose 105 70 - 110 mg/dL   Urinalysis, Reflex to Urine Culture    Collection Time: 03/27/25  5:39 PM    Specimen: Urine   Result Value Ref Range    Color, UA Yellow Straw, Damaris, Yellow, Light-Orange    Appearance, UA Clear Clear    pH, UA 6.0 5.0 - 8.0    Spec Grav UA 1.015 1.005 - 1.030    Protein, UA Trace (A) Negative    Glucose, UA Negative Negative    Ketones, UA Negative Negative    Bilirubin, UA 1+ (A) Negative    Blood, UA Negative Negative    Nitrites, UA Negative Negative    Urobilinogen, UA Negative <2.0 EU/dL    Leukocyte Esterase, UA Negative Negative   Clostridium difficile EIA    Collection Time: 03/27/25  6:46 PM    Specimen: Stool   Result Value Ref Range    C. DIFFICILE GDH AG Negative Negative    Clostridium Difficile Toxin A/B Negative Negative   POCT glucose    Collection Time: 03/27/25  6:58 PM   Result Value Ref Range    POCT Glucose 64 (L) 70 - 110 mg/dL   POCT glucose    Collection Time: 03/27/25  7:32 PM   Result Value Ref Range    POCT Glucose 81 70 - 110 mg/dL   POCT glucose    Collection Time: 03/27/25  7:48 PM   Result Value Ref Range    POCT Glucose 69 (L) 70 - 110 mg/dL   Folate    Collection Time: 03/27/25   8:37 PM   Result Value Ref Range    Folate Level 10.3 4.0 - 24.0 ng/mL   Cortisol    Collection Time: 03/27/25  8:37 PM   Result Value Ref Range    Cortisol 30.30 ug/dL   Basic Metabolic Panel    Collection Time: 03/27/25  8:37 PM   Result Value Ref Range    Sodium 144 136 - 145 mmol/L    Potassium 4.9 3.5 - 5.1 mmol/L    Chloride 111 (H) 95 - 110 mmol/L    CO2 6 (LL) 23 - 29 mmol/L    Glucose 64 (L) 70 - 110 mg/dL    BUN 53 (H) 8 - 23 mg/dL    Creatinine 2.6 (H) 0.5 - 1.4 mg/dL    Calcium 7.0 (L) 8.7 - 10.5 mg/dL    Anion Gap 27 (H) 8 - 16 mmol/L    eGFR 26 (L) >60 mL/min/1.73/m2   Troponin I    Collection Time: 03/27/25  8:37 PM   Result Value Ref Range    Troponin-I 0.054 (H) <=0.026 ng/mL   Lactic Acid, Plasma    Collection Time: 03/27/25  8:37 PM   Result Value Ref Range    Lactic Acid Level >12.0 (HH) 0.5 - 2.2 mmol/L   Procalcitonin    Collection Time: 03/27/25  8:37 PM   Result Value Ref Range    Procalcitonin 7.52 (H) <0.25 ng/mL   Hepatic Function Panel    Collection Time: 03/27/25  8:37 PM   Result Value Ref Range    Protein Total 3.4 (L) 6.0 - 8.4 gm/dL    Albumin 1.4 (L) 3.5 - 5.2 g/dL    Bilirubin Total 3.2 (H) 0.1 - 1.0 mg/dL    Bilirubin Direct 2.5 (H) 0.1 - 0.3 mg/dL    ALP 51 40 - 150 unit/L    AST 1,164 (H) 11 - 45 unit/L     (H) 10 - 44 unit/L   Protime-INR    Collection Time: 03/27/25  8:37 PM   Result Value Ref Range    PT 22.6 (H) 9.0 - 12.5 seconds    INR 2.1 (H) 0.8 - 1.2   T3, Free    Collection Time: 03/27/25  8:37 PM   Result Value Ref Range    T3 Free <1.50 (L) 2.30 - 4.20 pg/mL   Acetaminophen Level    Collection Time: 03/27/25  8:37 PM   Result Value Ref Range    Acetaminophen Level <3.0 (L) 10.0 - 20.0 ug/ml   Salicylate Level    Collection Time: 03/27/25  8:37 PM   Result Value Ref Range    Salicylate Level <5.0 (L) 15.0 - 30.0 mg/dL   POCT glucose    Collection Time: 03/27/25  8:46 PM   Result Value Ref Range    POCT Glucose 78 70 - 110 mg/dL   POCT glucose    Collection  Time: 03/27/25 10:25 PM   Result Value Ref Range    POCT Glucose 75 70 - 110 mg/dL   ISTAT PROCEDURE    Collection Time: 03/27/25 10:56 PM   Result Value Ref Range    POC PH 6.820 (LL) 7.35 - 7.45    POC PCO2 61.1 (HH) 35 - 45 mmHg    POC PO2 102 (H) 80 - 100 mmHg    POC HCO3 10.0 (L) 24 - 28 mmol/L    POC BE -24 (L) -2 to 2 mmol/L    POC SATURATED O2 89 95 - 100 %    POC Glucose 72 70 - 110 mg/dL    POC Sodium 139 136 - 145 mmol/L    POC Potassium 3.1 (L) 3.5 - 5.1 mmol/L    POC Ionized Calcium 1.00 (L) 1.06 - 1.42 mmol/L    POC Hematocrit 39 36 - 54 %PCV    Rate 18     Sample ARTERIAL     Site Shannon/UAC     Allens Test N/A     DelSys Adult Vent     Mode AC/PRVC     Vt 460     PEEP 5     FiO2 100    POCT glucose    Collection Time: 03/27/25 11:18 PM   Result Value Ref Range    POCT Glucose 73 70 - 110 mg/dL   Respiratory Infection Panel (PCR), Nasopharyngeal    Collection Time: 03/27/25 11:29 PM    Specimen: Nasopharyngeal Swab   Result Value Ref Range    Respiratory Infection Panel Source NP Swab     Adenovirus Not Detected Not Detected    Coronavirus 229E, Common Cold Virus Not Detected Not Detected    Coronavirus HKU1, Common Cold Virus Not Detected Not Detected    Coronavirus NL63, Common Cold Virus Not Detected Not Detected    Coronavirus OC43, Common Cold Virus Not Detected Not Detected    SARS-CoV2 (COVID-19) Qualitative PCR Not Detected Not Detected    Human Metapneumovirus Not Detected Not Detected    Human Rhinovirus/Enterovirus Not Detected Not Detected    Influenza A Not Detected Not Detected    Influenza B Not Detected Not Detected    Parainfluenza Virus 1 Not Detected Not Detected    Parainfluenza Virus 2 Not Detected Not Detected    Parainfluenza Virus 3 Not Detected Not Detected    Parainfluenza Virus 4 Not Detected Not Detected    Respiratory Syncytial Virus Not Detected Not Detected    Bordetella Parapertussis (PO4499) Not Detected Not Detected    Bordetella pertussis (ptxP) Not Detected Not  Detected    Chlamydia pneumoniae Not Detected Not Detected    Mycoplasma pneumoniae Not Detected Not Detected   POCT glucose    Collection Time: 03/28/25 12:02 AM   Result Value Ref Range    POCT Glucose 96 70 - 110 mg/dL   Prolactin    Collection Time: 03/28/25 12:04 AM   Result Value Ref Range    Prolactin 31.9 (H) 3.5 - 19.4 ng/mL   Basic Metabolic Panel    Collection Time: 03/28/25 12:04 AM   Result Value Ref Range    Sodium 140 136 - 145 mmol/L    Potassium 3.2 (L) 3.5 - 5.1 mmol/L    Chloride 107 95 - 110 mmol/L    CO2 6 (LL) 23 - 29 mmol/L    Glucose 77 70 - 110 mg/dL    BUN 50 (H) 8 - 23 mg/dL    Creatinine 2.6 (H) 0.5 - 1.4 mg/dL    Calcium 7.2 (L) 8.7 - 10.5 mg/dL    Anion Gap 27 (H) 8 - 16 mmol/L    eGFR 26 (L) >60 mL/min/1.73/m2   Troponin I    Collection Time: 03/28/25 12:04 AM   Result Value Ref Range    Troponin-I 0.058 (H) <=0.026 ng/mL   Lactic Acid, Plasma    Collection Time: 03/28/25 12:04 AM   Result Value Ref Range    Lactic Acid Level >12.0 (HH) 0.5 - 2.2 mmol/L   ISTAT PROCEDURE    Collection Time: 03/28/25  1:33 AM   Result Value Ref Range    POC PH 6.857 (LL) 7.35 - 7.45    POC PCO2 55.1 (H) 35 - 45 mmHg    POC PO2 63 (L) 80 - 100 mmHg    POC HCO3 9.8 (L) 24 - 28 mmol/L    POC BE -24 (L) -2 to 2 mmol/L    POC SATURATED O2 69 95 - 100 %    Rate 24     Sample ARTERIAL     Site Shannon/UAC     Allens Test N/A     DelSys Adult Vent     Mode AC/PRVC     Vt 460     PEEP 10     FiO2 100    POCT glucose    Collection Time: 03/28/25  1:44 AM   Result Value Ref Range    POCT Glucose 101 70 - 110 mg/dL   Hemoglobin    Collection Time: 03/28/25  1:58 AM   Result Value Ref Range    HGB 12.7 (L) 14.0 - 18.0 gm/dL   Hematocrit    Collection Time: 03/28/25  1:58 AM   Result Value Ref Range    HCT 41.6 40.0 - 54.0 %   POCT METHEMOGLOBIN Q24H    Collection Time: 03/28/25  2:11 AM   Result Value Ref Range    Methemoglobin -0.3 (A) 0 - 3 %   POCT glucose    Collection Time: 03/28/25  3:03 AM   Result Value Ref  Range    POCT Glucose 100 70 - 110 mg/dL   POCT glucose    Collection Time: 03/28/25  3:26 AM   Result Value Ref Range    POCT Glucose 100 70 - 110 mg/dL   Basic Metabolic Panel    Collection Time: 03/28/25  3:30 AM   Result Value Ref Range    Sodium 139 136 - 145 mmol/L    Potassium 3.9 3.5 - 5.1 mmol/L    Chloride 108 95 - 110 mmol/L    CO2 5 (LL) 23 - 29 mmol/L    Glucose 81 70 - 110 mg/dL    BUN 50 (H) 8 - 23 mg/dL    Creatinine 2.7 (H) 0.5 - 1.4 mg/dL    Calcium 6.8 (LL) 8.7 - 10.5 mg/dL    Anion Gap 26 (H) 8 - 16 mmol/L    eGFR 25 (L) >60 mL/min/1.73/m2   Lactic Acid, Plasma    Collection Time: 03/28/25  3:30 AM   Result Value Ref Range    Lactic Acid Level >12.0 (HH) 0.5 - 2.2 mmol/L   Hepatic Function Panel    Collection Time: 03/28/25  3:30 AM   Result Value Ref Range    Protein Total 3.5 (L) 6.0 - 8.4 gm/dL    Albumin 1.9 (L) 3.5 - 5.2 g/dL    Bilirubin Total 4.0 (H) 0.1 - 1.0 mg/dL    Bilirubin Direct 3.3 (H) 0.1 - 0.3 mg/dL    ALP 84 40 - 150 unit/L    AST 4,112 (H) 11 - 45 unit/L     (H) 10 - 44 unit/L   Magnesium    Collection Time: 03/28/25  3:30 AM   Result Value Ref Range    Magnesium  3.0 (H) 1.6 - 2.6 mg/dL   Phosphorus    Collection Time: 03/28/25  3:30 AM   Result Value Ref Range    Phosphorus Level 10.0 (HH) 2.7 - 4.5 mg/dL   Protime-INR    Collection Time: 03/28/25  3:30 AM   Result Value Ref Range    PT 27.1 (H) 9.0 - 12.5 seconds    INR 2.5 (H) 0.8 - 1.2   APTT    Collection Time: 03/28/25  3:30 AM   Result Value Ref Range    PTT 88.3 (H) 21.0 - 32.0 seconds   CK    Collection Time: 03/28/25  3:30 AM   Result Value Ref Range    CPK 6,168 (H) 20 - 200 U/L   CBC with Differential    Collection Time: 03/28/25  3:30 AM   Result Value Ref Range    WBC 1.41 (LL) 3.90 - 12.70 K/uL    RBC 4.10 (L) 4.60 - 6.20 M/uL    HGB 12.7 (L) 14.0 - 18.0 gm/dL    HCT 41.6 40.0 - 54.0 %     (H) 82 - 98 fL    MCH 31.0 27.0 - 50.0 pg    MCHC 30.5 (L) 32.0 - 36.0 g/dL    RDW 15.7 (H) 11.5 - 14.5 %     Platelet Count 87 (L) 150 - 450 K/uL    MPV 9.7 9.2 - 12.9 fL    Nucleated RBC 26 (H) <=0 /100 WBC   Ammonia    Collection Time: 03/28/25  3:30 AM   Result Value Ref Range    Ammonia 303 (H) 10 - 50 umol/L   Manual Differential    Collection Time: 03/28/25  3:30 AM   Result Value Ref Range    Gran # (ANC) 0.4 K/uL    Segmented Neutrophil % 30.0 (L) 38.0 - 73.0 %    Lymphocyte % 58.0 (H) 18.0 - 48.0 %    Monocyte % 12.0 4.0 - 15.0 %    Platelet Estimate Decreased (A)      Poik Moderate     Polychromasia Occasional     Val Cells Moderate    Fibrinogen    Collection Time: 03/28/25  3:30 AM   Result Value Ref Range    Fibrinogen 299 182 - 400 mg/dL   ISTAT PROCEDURE    Collection Time: 03/28/25  3:55 AM   Result Value Ref Range    POC PH 6.882 (LL) 7.35 - 7.45    POC PCO2 42.6 35 - 45 mmHg    POC PO2 98 80 - 100 mmHg    POC HCO3 8.0 (L) 24 - 28 mmol/L    POC BE -25 (L) -2 to 2 mmol/L    POC SATURATED O2 90 95 - 100 %    Rate 24     Sample ARTERIAL     Site Shannon/UAC     Allens Test N/A     DelSys Adult Vent     Mode AC/PRVC     Vt 460     PEEP 5     FiO2 100        Imaging Results:  Imaging Results              CT Chest Abdomen Pelvis Without Contrast (XPD) (Final result)  Result time 03/27/25 22:33:11      Final result by Vamsi Chaudhari MD (03/27/25 22:33:11)                   Impression:      Diffuse colonic wall thickening concerning for infectious or inflammatory colitis.    Small bowel dilation with multiple air-fluid levels. Correlation for ileus or obstruction. No transition point is identified. Consider small-bowel follow-through.    Small bilateral pleural effusions.  Moderate bilateral consolidative opacities with some air bronchograms concerning for infection or aspiration.  Smaller sub solid opacities also noted throughout the lungs.    Moderate ascites.  Malignant ascites not excluded on the basis of this exam.    Complete findings as above.    All CT scans at this facility are performed  using dose  modulation techniques as appropriate to performed exam including the following:  automated exposure control; adjustment of mA and/or kV according to the patients size (this includes techniques or standardized protocols for targeted exams where dose is matched to indication/reason for exam: i.e. extremities or head);  iterative reconstruction technique.      Electronically signed by: Vamsi Chaudhari  Date:    03/27/2025  Time:    22:33               Narrative:    EXAMINATION:  CT CHEST ABDOMEN PELVIS WITHOUT CONTRAST(XPD)    CLINICAL HISTORY:  Sepsis;    TECHNIQUE:  Axial images of the chest, abdomen, and pelvis were acquired  after the use of 0 cc [id number] IV contrast.  Coronal and sagittal reconstructions were also obtained    COMPARISON:  None    FINDINGS:  Thoracic soft tissues: No significant abnormality.    Aorta: Normal in course and caliber, without significant atherosclerotic plaque. There are three branching vessels at the arch.    Heart: Normal in size. No pericardial effusion.    Juju/Mediastinum: No significant lymphadenopathy    Lungs: Small bilateral pleural effusions.  Consolidation with some air bronchograms concerning for infection or aspiration.  Smaller sub solid opacities also noted throughout the lungs.    Liver: Low-density a Paddock lesion possibly a cyst but not fully characterized.    Gallbladder: Sludge and vicarious excretion of contrast in the gallbladder.    Bile Ducts: No evidence of dilated ducts.    Pancreas: Known pancreatic tail lesion measures approximately 4 cm. Recommend direct comparison to prior images.    Spleen: Unremarkable.    Adrenals: Unremarkable.    Kidneys/ Ureters: Normal in size and location. Bilateral renal simple cysts.  No hydronephrosis.   No ureteral dilatation.    Bladder: No evidence of wall thickening.    Reproductive organs: Unremarkable.    GI Tract/Mesentery: Diffuse colonic wall thickening concerning for infectious or inflammatory colitis.  Small bowel  dilation with multiple air-fluid levels.  Correlation for ileus or obstruction.  No transition point is identified.  Consider small-bowel follow-through.    Peritoneal Space: Moderate ascites.  no free air.    Retroperitoneum:  No significant adenopathy.    Abdominal wall:  Unremarkable.    Vasculature: No significant atherosclerosis or aneurysm.    Bones: No acute fracture. Age-appropriate degenerative changes.                                       X-Ray Chest AP Portable (Final result)  Result time 03/27/25 20:40:44      Final result by Petros Roper MD (03/27/25 20:40:44)                   Impression:      Interval intubation, gastric tube placement, and right-sided central line placement as above.    Further underinflated lungs with bilateral interstitial and hazy opacities.    Finalized on: 3/27/2025 8:40 PM By:  Petros Roper MD  [facility]# [id number]      2025-03-27 20:42:52.151     [facility]               Narrative:    EXAM: XR CHEST AP PORTABLE    CLINICAL HISTORY:  [Z78.9]-Other specified health status.    TECHNIQUE: Frontal view of the chest.    COMPARISON: X-ray dated 03/27/2025    FINDINGS:  Interval intubation with endotracheal tube terminating approximately 4 cm above the viral.  Interval placement of an esophagogastric tube with distal tip and side-port projecting over the distal and mid gastric body respectively.  Interval placement of a right-sided jugular central line with distal catheter tip projecting over the cavoatrial junction. Cardiomediastinal silhouette is unchanged in size and contour. Trachea is midline. Lungs are underinflated with bilateral interstitial and hazy opacities. No significant pleural effusion or pneumothorax. No acute bony abnormality.  Degenerative changes in the spine.                                         CT Head Without Contrast (Final result)  Result time 03/27/25 16:31:34      Final result by Mary Anne Solorio MD (03/27/25 16:31:34)                   Impression:      1.     No evidence of an acute intracranial abnormality.  2.    Age-related global parenchymal volume loss and white matter changes that likely reflect chronic microvascular disease.  3.    Remote left basal ganglia lacunar infarcts.      Finalized on: 3/27/2025 4:31 PM By:  Mary Anne Solorio MD  [facility]# [id number]      2025-03-27 16:33:38.115     [facility]               Narrative:    PROCEDURE:  CT HEAD WITHOUT CONTRAST    INDICATION:  Altered mental status    TECHNIQUE:  Multidetector-row CT examination of the brain was performed from base to vertex [without] intravenous contrast material.This CT utilized automated exposure control and/or adjustment of the mA according to patient size and/or iterative reconstruction technique(s).    COMPARISON:No prior studies available for direct comparison.    FINDINGS:  The ventricles and sulci are normal in size and contour for age.  There is no evidence of intracranial hemorrhage or extra-axial collection.  No shift of the midline structures or mass effect is seen.  Gray-white matter differentiation is maintained.  Small vessel ischemic changes in the periventricular white matter.  Remote lacunar infarcts in the left basal ganglia.  Dystrophic calcifications along the insula bilaterally.    No acute fracture of the skull base or calvarium is identified.  The visualized paranasal sinuses are clear.  The mastoid air cells are clear.  The visualized orbits are unremarkable.  Atherosclerosis                                         X-Ray Chest AP Portable (Final result)  Result time 03/27/25 15:58:44      Final result by Fritz Goodwin MD (03/27/25 15:58:44)                   Impression:      See above.      Electronically signed by: Fritz Goodwin  Date:    03/27/2025  Time:    15:58               Narrative:    EXAMINATION:  XR CHEST AP PORTABLE    CLINICAL HISTORY:  sob;    TECHNIQUE:  Portable chest x-ray    COMPARISON:  03/15/2025    FINDINGS:  The heart is normal in size.  Interval development of  bibasilar atelectatic change when compared to prior study.                                       The EKG was ordered, reviewed, and independently interpreted by the ED provider.  Interpretation time: 15:34  Rate: 86 BPM  Rhythm: normal sinus rhythm  Interpretation: Moderate voltage criteria for LVH, may be normal variant (R in aVL, Ulises product). Prolonged QT. No STEMI.             The Emergency Provider reviewed the vital signs and test results, which are outlined above.     ED Discussion     7:51 PM: Discussed case with Dr. Migdalia MD (Pulmonary Disease). Dr. Negron agrees with current care and management of pt and accepts admission.   Admitting Service: Hospital Medicine  Admitting Physician: Dr. Negron  Admit to:  ICU    7:51 PM: Re-evaluated pt. I have discussed test results, shared treatment plan, and the need for admission with patient/family/caretaker at bedside. Pt and/or family/caretaker express understanding at this time and agree with all information. All questions answered. Pt/caretaker/family member(s) have no further questions or concerns at this time. Pt is ready for admit.                Medical Decision Making  70-year-old male with a history of multifocal HCC and neuroendocrine tumor of the pancreas who presents to the ER from [facility] for altered mental status.  Glucose noted to be 11, 99 after treatment.  Poor p.o. intake due to decreased appetite with persistent diarrhea.  Patient's ex-wife/POA reports that he has had confusion with difficulty speaking for a few days.  Patient is hypotensive and responds to sepsis fluids.  He is tachypnea with a new oxygen requirement.  Chest x-ray shows atelectasis.  CT head shows remote changes with no acute findings.  Repeat glucose 33, appears to be maintaining a normal glucose after D50 bolus.  ABG shows severe metabolic acidosis.  CMP shows an elevated anion gap.  Started on a bicarb drip.  Tolerating nasal cannula.  Lactic  acid greater than 12.  Potassium 3.3 with a magnesium of 3.7.  RJ BUN 58 creatinine 27.  Hepatic encephalopathy, ammonia level 167, , ALT 79, total bili 4.5.  INR 1.8.  Lipase 16.  Troponin 0.065, TSH 10.7 with a normal free T4.  Stool cultures pending. UA unremarkable.  Possible sepsis, treated with IV Zosyn.  Had a discussion with patient, patient's family, and POA regarding code status given severity of condition.  They are all requesting full code measures.  Patient had an acute change while awaiting admission to ICU in the emergency department.  He became progressively altered and hypoxic requiring intubation.  Intubation was complicated with likely aspiration.  He continued to be hypotensive requiring IV pressors.    Amount and/or Complexity of Data Reviewed  Labs: ordered. Decision-making details documented in ED Course.  Radiology: ordered. Decision-making details documented in ED Course.  ECG/medicine tests: ordered and independent interpretation performed. Decision-making details documented in ED Course.    Risk  Prescription drug management.  Decision regarding hospitalization.    Critical Care  Total time providing critical care: 60 minutes       Additional MDM:   Differential Diagnosis:   Hemorrhage, alcohol intoxication or withdrawal, ischemia, hypoglycemia, hypoxia, uremia, toxin, sepsis, fever, overdose, seizure/postictal phase, icterus, thyroid abnormality              ED Medication(s):  Medications   sodium chloride 0.9% bolus 2,085 mL 2,085 mL (0 mLs Intravenous Stopped 3/27/25 1656)   dextrose 50% injection 25 g (0 g Intravenous Stopped 3/27/25 1822)   sodium bicarbonate 150 mEq in D5W 1,000 mL infusion ( Intravenous Stopped 3/27/25 2259)   piperacillin-tazobactam (ZOSYN) 4.5 g in D5W 100 mL IVPB (MB+) (0 g Intravenous Stopped 3/27/25 1958)   sodium chloride 0.9% bolus 1,000 mL 1,000 mL (0 mLs Intravenous Stopped 3/27/25 2140)   rocuronium 10 mg/mL injection (80 mg  Given 3/27/25 1938)    etomidate injection 20 mg (20 mg Intravenous Given 3/27/25 1937)   dextrose 10 % infusion ( Intravenous New Bag 3/27/25 2009)   vancomycin 1,500 mg in 0.9% NaCl 250 mL IVPB (admixture device) (0 mg Intravenous Stopped 3/28/25 0022)   hydrocortisone sodium succinate injection 200 mg (200 mg Intravenous Given 3/27/25 2252)   potassium chloride 40 mEq in 100 mL IVPB (FOR CENTRAL LINE ADMINISTRATION ONLY) (0 mEq Intravenous Stopped 3/28/25 0342)   rocuronium injection 80 mg (80 mg Intravenous Given by Other 3/28/25 0212)   calcium chloride 100 mg/mL (10 %) injection 1 g (1 g Intravenous Given 3/28/25 0446)   sodium bicarbonate solution 100 mEq (100 mEq Intravenous Given 3/28/25 0449)   calcium chloride 100 mg/mL (10 %) injection (1,000 mg  Given by Other 3/28/25 0512)   NORepinephrine bitartrate-NaCl 4 mg/250 mL (16 mcg/mL) infusion Soln (4 mg  New Bag 3/28/25 0513)   EPINEPHrine 0.1 mg/mL injection (1 mg Intravenous Given 3/28/25 0528)   calcium chloride 100 mg/mL (10 %) injection (1 g Intravenous Given 3/28/25 0525)   sodium bicarbonate 8.4 % (1 mEq/mL) injection (50 mEq Intravenous Given 3/28/25 0526)       Discharge Medication List as of 3/28/2025  9:07 AM                  Scribe Attestation:   Scribe #1: I performed the above scribed service and the documentation accurately describes the services I performed. I attest to the accuracy of the note.     Attending:   Physician Attestation Statement for Scribe #1: I, Sherin Florentino DO, personally performed the services described in this documentation, as scribed by Jose Rain, in my presence, and it is both accurate and complete.           Clinical Impression       ICD-10-CM ICD-9-CM   1. Sepsis with encephalopathy and septic shock, due to unspecified organism  A41.9 038.9    R65.21 995.92    G93.41 785.52     348.31   2. SOB (shortness of breath)  R06.02 786.05   3. Intubation of airway performed without difficulty  Z78.9 V49.89   4. Shock  R57.9 785.50    5. Acute hypoxemic respiratory failure  J96.01 518.81   6. On mechanically assisted ventilation  Z99.11 V46.11   7. Acute metabolic encephalopathy  G93.41 348.31   8. Hepatic encephalopathy  K76.82 572.2   9. Acute respiratory failure with hypoxia  J96.01 518.81   10. High anion gap metabolic acidosis  E87.29 276.2   11. RJ (acute kidney injury)  N17.9 584.9   12. Liver failure with hepatic coma, unspecified chronicity  K72.91 572.2       Disposition:   Disposition: Admitted  Condition: Serious         Sherin Florentino,   03/28/25 2023

## 2025-03-28 PROBLEM — J96.01 ACUTE HYPOXEMIC RESPIRATORY FAILURE: Status: RESOLVED | Noted: 2025-01-01 | Resolved: 2025-01-01

## 2025-03-28 PROBLEM — Z99.11 ON MECHANICALLY ASSISTED VENTILATION: Status: RESOLVED | Noted: 2025-01-01 | Resolved: 2025-01-01

## 2025-03-28 PROBLEM — A41.9 SEPSIS WITH ACUTE ORGAN DYSFUNCTION: Status: RESOLVED | Noted: 2025-01-01 | Resolved: 2025-01-01

## 2025-03-28 PROBLEM — K72.01 ACUTE LIVER FAILURE WITH HEPATIC COMA: Status: RESOLVED | Noted: 2025-01-01 | Resolved: 2025-01-01

## 2025-03-28 PROBLEM — G93.41 ACUTE METABOLIC ENCEPHALOPATHY: Status: RESOLVED | Noted: 2025-01-01 | Resolved: 2025-01-01

## 2025-03-28 PROBLEM — E16.2 HYPOGLYCEMIA: Status: RESOLVED | Noted: 2025-01-01 | Resolved: 2025-01-01

## 2025-03-28 PROBLEM — N17.9 AKI (ACUTE KIDNEY INJURY): Status: RESOLVED | Noted: 2025-01-01 | Resolved: 2025-01-01

## 2025-03-28 PROBLEM — R65.20 SEPSIS WITH ACUTE ORGAN DYSFUNCTION: Status: RESOLVED | Noted: 2025-01-01 | Resolved: 2025-01-01

## 2025-03-28 NOTE — ASSESSMENT & PLAN NOTE
"This patient does not have evidence of infective focus, suspect aspiration pneumonia  My overall impression is septic shock due to lactate > 4, MAP < 65, and SBP < 90.  Source: Unknown  Antibiotics given-   Antibiotics (72h ago, onward)      Start     Stop Route Frequency Ordered    03/28/25 0900  mupirocin 2 % ointment         04/02/25 0859 Nasl 2 times daily 03/27/25 2205    03/28/25 0300  piperacillin-tazobactam (ZOSYN) 4.5 g in D5W 100 mL IVPB (MB+)         -- IV Every 8 hours (non-standard times) 03/27/25 2032 03/27/25 2145  rifAXIMin tablet 550 mg         -- OG 2 times daily 03/27/25 2033 03/27/25 2145  vancomycin 1,500 mg in 0.9% NaCl 250 mL IVPB (admixture device)         -- IV Once 03/27/25 2040 03/27/25 2132  vancomycin - pharmacy to dose  (vancomycin IVPB (PEDS and ADULTS))        Placed in "And" Linked Group    -- IV pharmacy to manage frequency 03/27/25 2032          Latest lactate reviewed-  Recent Labs   Lab 03/27/25 2037   LACTATE >12.0*     Organ dysfunction indicated by Acute kidney injury, Acute liver injury, Acute respiratory failure, and Encephalopathy    Fluid challenge was administered in ED     Post- resuscitation assessment Yes - I attest a sepsis perfusion exam was performed within 6 hours of sepsis, severe sepsis, or septic shock presentation, following fluid resuscitation.    Will Start Pressors- Levophed for MAP of 65  Source control achieved by: antibiotics  - trend lactate  - cultures pending  - continue abx  - CT c/a/p pending    "

## 2025-03-28 NOTE — ASSESSMENT & PLAN NOTE
- HAGMA w/acute liver and renal failure  - treat above  - intubated, mental status barrier to extubation  - CT head negative  - stat EEG ordered  - neuro monitoring w/further workup as indicated

## 2025-03-28 NOTE — ASSESSMENT & PLAN NOTE
Patient's blood pressure range in the last 24 hours was: BP  Min: 72/45  Max: 136/71.The patient's inpatient anti-hypertensive regimen is listed below:  Current Antihypertensives     Plan  - currently requiring vasopressor support  - add meds as indicated  - check echo

## 2025-03-28 NOTE — HOSPITAL COURSE
3/27: Since admission there has been progressive deterioration in patient condition despite maximal escalation of care. Family was called and updated and was en route to hospital. Patient declined into cardiac arrest. See code documentation. No ROSC achieved. Dr. Langley declared patient  at 0530. Family updated. Condolences and support offered.

## 2025-03-28 NOTE — PROGRESS NOTES
"Pharmacokinetic Initial Assessment: IV Vancomycin    Assessment/Plan:    Initiate intravenous vancomycin with loading dose of 1500 mg once with subsequent doses when random concentrations are less than 20 mcg/mL  Desired empiric serum trough concentration is 15 to 20 mcg/mL  Draw vancomycin random level on 03/28 at 1700.  Pharmacy will continue to follow and monitor vancomycin.      Please contact pharmacy at extension [phone] with any questions regarding this assessment.     Thank you for the consult,   Ashlie Delaney       Patient brief summary:  Deven Jaramillo is a 70 y.o. male initiated on antimicrobial therapy with IV Vancomycin for treatment of suspected sepsis    Drug Allergies:   Review of patient's allergies indicates:  No Known Allergies    Actual Body Weight:   80.1 kg    Renal Function:   Estimated Creatinine Clearance: 27.2 mL/min (A) (based on SCr of 2.6 mg/dL (H)).,     Dialysis Method (if applicable):  N/A    CBC (last 72 hours):  Recent Labs   Lab Result Units 03/27/25  1539   WBC K/uL 4.73   HGB gm/dL 15.4   HCT % 49.1   Platelet Count K/uL 230   Lymph % % 19.0   Mono % % 2.7*   Eos % % 0.2   Basophil % % 0.2       Metabolic Panel (last 72 hours):  Recent Labs   Lab Result Units 03/27/25  1539 03/27/25  1739 03/27/25  2037   Sodium mmol/L 141  --  144   Potassium mmol/L 3.3*  --  4.9   Chloride mmol/L 105  --  111*   CO2 mmol/L 6*  --  6*   Glucose mg/dL 33*  --  64*   Glucose, UA   --  Negative  --    BUN mg/dL 58*  --  53*   Creatinine mg/dL 2.7*  --  2.6*   Albumin g/dL 2.0*  --  1.4*   Bilirubin Total mg/dL 4.5*  --  3.2*   ALP unit/L 60  --  51   AST unit/L 325*  --  1,164*   ALT unit/L 79*  --  221*   Magnesium  mg/dL 3.7*  --   --        Drug levels (last 3 results):  No results for input(s): "VANCOMYCINRA", "VANCORANDOM", "VANCOMYCINPE", "VANCOPEAK", "VANCOMYCINTR", "VANCOTROUGH" in the last 72 hours.    Microbiologic Results:  Microbiology Results (last 7 days)       Procedure Component Value " Units Date/Time    Clostridium difficile EIA [[id number]]  (Normal) Collected: 03/27/25 1846    Order Status: Completed Specimen: Stool Updated: 03/27/25 2005     C. DIFFICILE GDH AG Negative     Clostridium Difficile Toxin A/B Negative    Culture, Respiratory with Gram Stain [[id number]]     Order Status: Sent Specimen: Respiratory from Endotracheal Aspirate     Culture, MRSA [[id number]]     Order Status: Sent Specimen: MRSA source     Respiratory Infection Panel (PCR), Nasopharyngeal [[id number]]     Order Status: Sent Specimen: Nasopharyngeal Swab     Stool culture **CANNOT BE ORDERED STAT** [[id number]] Collected: 03/27/25 1846    Order Status: Sent Specimen: Stool Updated: 03/27/25 1849    Influenza A & B by Molecular [[id number]]  (Normal) Collected: 03/27/25 1542    Order Status: Completed Specimen: Nasal Swab Updated: 03/27/25 1619     INFLUENZA A MOLECULAR Negative     INFLUENZA B MOLECULAR  Negative    Blood culture #2 **CANNOT BE ORDERED STAT** [[id number]] Collected: 03/27/25 1553    Order Status: Sent Specimen: Blood from Peripheral, Antecubital, Right Updated: 03/27/25 1609    Blood culture #1 **CANNOT BE ORDERED STAT** [[id number]] Collected: 03/27/25 1553    Order Status: Sent Specimen: Blood from Peripheral, Antecubital, Left Updated: 03/27/25 1609

## 2025-03-28 NOTE — PROGRESS NOTES
Pharmacist Renal Dose Adjustment Note    Deven Jaramillo is a 70 y.o. male being treated with the medication zosyn    Patient Data:    Vital Signs (Most Recent):  Pulse: 72 (03/27/25 2005)  Resp: 18 (03/27/25 2005)  BP: (!) 92/59 (03/27/25 2005)  SpO2: 97 % (03/27/25 2005) Vital Signs (72h Range):  Pulse:  [69-97]   Resp:  [10-30]   BP: ()/(45-71)   SpO2:  [81 %-98 %]      Recent Labs   Lab 03/27/25  1539   CREATININE 2.7*     Serum creatinine: 2.7 mg/dL (H) 03/27/25 1539  Estimated creatinine clearance: 24.1 mL/min (A)    Medication:zosyn 4.5 gm iv q12hrs will be changed to medication:zosyn 4.5 gm I v q8hrs per renal dosing protocol and crcl > 20 ml/min   Pharmacist's Name: Sandy Daly [facility]  Pharmacist's Extension: [phone]

## 2025-03-28 NOTE — ASSESSMENT & PLAN NOTE
- likely in setting of acute liver dysfunction  - requiring D10 infusion  - hourly glucose checks until stable  - will give stress dose steroids, wean as able

## 2025-03-28 NOTE — DISCHARGE SUMMARY
[facility] - [facility] ([facility])  Critical Care Medicine  Discharge Summary      Patient Name: Deven Jaramillo  MRN: [id number]  Admission Date: 3/27/2025  Hospital Length of Stay: 1 days  Discharge Date and Time: No discharge date for patient encounter.  Attending Physician: Michelle Negron MD   Discharging Provider: Africa Fried NP  Primary Care Provider: Alina Jane NP  Reason for Admission: Metabolic encephalopathy    HPI:   Deven Jaramillo is a 70-year-old male with a PMHx of HTN, HCC, neuroendocrine tumor of pancreatic tail, chronic Hep C, last chemo was in February.     He presented by EMS from [facility] for AMS and lethargy. Patient was found to have a low glucose of 11 on scene, which was treated. Wife and daughter at bedside provided HPI and report patient has been fatigued for several day, but was still walking until today. He has been having increasingly slurred speech, lethargy over the past 2 days, with today much less verbal, but following commands. They thought his speech changes was d/t not wearing dentures. He has been dealing with decreased oral/solute intake, anasarca recently, and diarrhea x 1 week. He was being seen at [facility] today for f/u and to decide on further chemo tx. No known fever/chills, other complaints or indications of bleeding per family.     On arrival in ED, patient again hypoglycemic with only temporary improvement to treatment. He was hypotensive, tachypneic, and has a new oxygen requirement. He was non-verbal, but did follow commands. He did respond to sepsis fluids, but again became hypotensive after. CT head showed remote changes, but no acute findings. Unknown if seizure PTA. Labs significant for severe anion gap metabolic acidosis. Lactate >12. K 3.3, elevated Mg, BUN 58, Cr 2.7, , ALT 79, t-bili 4.5, INR 1.8, normal lipase. Troponin 0.65, , CPK 2699. TSH 10 w/normal T4. Procalcitonin 7.5. Flu/COV neg. UA w/no infection. Stool studies  "including C-diff sent, C-diff antigen/toxin negative. AB.831/47.8/114/8, ionized Ca 1.03. While in the ED, he did become non-responsive, not adequately protecting airway, therefore he was intubated. On intubation, some aspirate noted in airway. CXR w/bibasilar atelectasis. Critical care consulted for admission.           * No surgery found *    Indwelling Lines/Drains at Time of Discharge:   Lines/Drains/Airways       Central Venous Catheter Line  Duration             Percutaneous Central Line - Triple Lumen  25 Internal Jugular Right <1 day              Drain  Duration                  NG/OG Tube 25 7.5 Fr. Right mouth <1 day         Urethral Catheter 25 172 Latex 16 Fr. <1 day              Airway  Duration                  Airway - Non-Surgical 25 Endotracheal Tube <1 day              Arterial Line  Duration                  Arterial Line <1 day                  Hospital Course:   3/27: Since admission there has been progressive deterioration in patient condition despite maximal escalation of care. Family was called and updated and was en route to hospital. Patient declined into cardiac arrest. See code documentation. No ROSC achieved. Dr. Langley declared patient  at 0530. Family updated. Condolences and support offered.      Consults (From admission, onward)          Status Ordering Provider     Inpatient consult to General Surgery  Once        Provider:  Edi Saunders MD    Acknowledged AKBAR CALLES     Pharmacy to dose Vancomycin consult  Once        Provider:  (Not yet assigned)   Placed in "And" Linked Group    Acknowledged AKBAR CALLES          Significant Labs:  All pertinent labs within the past 24 hours have been reviewed.    Significant Imaging:  I have reviewed all pertinent imaging results/findings within the past 24 hours.    Pending Diagnostic Studies:       Procedure Component Value Units Date/Time    Echo [[id number]]     Order " Status: Sent Lab Status: No result     Legionella antigen, urine [[id number]] Collected: 25 2329    Order Status: Sent Lab Status: In process Updated: 25    Specimen: Urine, Catheterized     Prolactin [[id number]] Collected: 25 0004    Order Status: Sent Lab Status: In process Updated: 25    Specimen: Blood     US Lower Extremity Veins Bilateral [[id number]]     Order Status: Sent Lab Status: No result     X-Ray Chest AP Portable [[id number]] Resulted: 25    Order Status: Sent Lab Status: In process Updated: 25          Final Active Diagnoses:      Problems Resolved During this Admission:    Diagnosis Date Noted Date Resolved POA    PRINCIPAL PROBLEM:  Acute metabolic encephalopathy [G93.41] 2025 Yes    Acute liver failure with hepatic coma [K72.01] 2025 Yes    RJ (acute kidney injury) [N17.9] 2025 Yes    Sepsis with acute organ dysfunction [A41.9, R65.20] 2025 Yes    On mechanically assisted ventilation [Z99.11] 2025 Not Applicable    Acute hypoxemic respiratory failure [J96.01] 2025 No    Hypoglycemia [E16.2] 2025 Yes    Hypertension [I10]  2025 Yes     Chronic     No new Assessment & Plan notes have been filed under this hospital service since the last note was generated.  Service: Pulmonology    Discharged Condition:     Disposition:     Time spent on this discharge: 31 minutes    Africa Fried NP  Critical Care Medicine  [facility] - [facility] ([facility])

## 2025-03-28 NOTE — ASSESSMENT & PLAN NOTE
- vent bundle in place  - titrate support per ABG and SpO2>90%  - SAT/SBT, however mental status likely barrier to extubation

## 2025-03-28 NOTE — EICU
Intervention Initiated From:  Bedside    Valentin intervened regarding:  Documentation    Nurse Notified:  Yes    Doctor Notified:  No    Comments: Called into room with CPR being started. See Code flowsheet.  
Yes

## 2025-03-28 NOTE — PROCEDURES
EEG    Date/Time: 3/27/2025 11:44 PM    Performed by: Arben Jaramillo MD  Authorized by: Africa Fried NP      ELECTROENCEPHALOGRAM REPORT    DATE OF SERVICE: 3/27/25  EEG NUMBER: BR   REQUESTED BY: Fariha  LOCATION OF SERVICE: [place]     METHODOLOGY   Electroencephalographic (EEG) recording is with electrodes placed according to the International 10-20 placement system.  Thirty two (32) channels of digital signal (sampling rate of 512/sec) including T1 and T2 was simultaneously recorded from the scalp and may include  EKG, EMG, and/or eye monitors.  Recording band pass was 0.1 to 512 hz.  Digital video recording of the patient is simultaneously recorded with the EEG.  The patient is instructed report clinical symptoms which may occur during the recording session.  EEG and video recording is stored and archived in digital format. Activation procedures which include photic stimulation, hyperventilation and instructing patients to perform simple task are done in selected patients.    The EEG is displayed on a monitor screen and can be reviewed using different montages.  Computer assisted analysis is employed to detect spike and electrographic seizure activity.   The entire record is submitted for computer analysis.  The entire recording is visually reviewed and the times identified by computer analysis as being spikes or seizures are reviewed again.  Compresses spectral analysis (CSA) is also performed on the activity recorded from each individual channel.  This is displayed as a power display of frequencies from 0 to 30 Hz over time.   The CSA is reviewed looking for asymmetries in power between homologous areas of the scalp and then compared with the original EEG recording.     [employer] software was also utilized in the review of this study.  This software suite analyzes the EEG recording in multiple domains.  Coherence and rhythmicity is computed to identify EEG sections which may contain organized  seizures.  Each channel undergoes analysis to detect presence of spike and sharp waves which have special and morphological characteristic of epileptic activity.  The routine EEG recording is converted from spacial into frequency domain.  This is then displayed comparing homologous areas to identify areas of significant asymmetry.  Algorithm to identify non-cortically generated artifact is used to separate eye movement, EMG and other artifact from the EEG    EEG FINDINGS  The record shows a fair  organization at rest, consisting of a 7 Hz posterior dominant rhythm with fair  reactivity. There is mild bilateral beta activity. There is continuous diffuse theta range background slowing.    Drowsiness is characterized by attenuation of the background, vertex waves, and bilateral theta slowing.    Provocative maneuvers including hyperventilation and photic stimulation were not  performed.     EKG recording shows a sinus rhythm.    There is no push button or clinical event.    IMPRESSION:  Abnormal study due to mild to moderate  diffuse background slowing consistent with diffuse cerebral dysfunction and encephalopathy which may be on the basis of toxic, metabolic, or primary neuronal disorder.     Arben Jaramillo MD

## 2025-03-28 NOTE — PROGRESS NOTES
03/28/25 0541   Attending Physician Contact   Attending Physician Notified Y   Attending Physician Name Dr. Negron   Date Notified 03/28/25   Time Notified 0541   Postmortem Checklist   Date of Death 03/28/25   Time of Death 0531   Pronounced By Dr. KRYSTAL Langley   Name of  Notified of Death Alina Jaramillo    Relationship to Patient Adult Friend    Phone Number [phone]   Additional Notifications Other (Comment)  (House Supervisor contacted by Charge Nurse.)   Organ Procurement   Organ Procurement Agency Notified Y   Name of Organ Procurement Screener Nathalia Dye   Eligible for Donation No   Referral Number [id number]      Reason for Report Patient Death within 24 hours of admission   [place]  notified [facility]    Decision Case Declined- WILL NOT investigate   Date of Decision 03/28/25   Time of Decision 0652   's Representative Name Matlock   's Representative Phone Number [phone]

## 2025-03-28 NOTE — PROGRESS NOTES
Pharmacist Renal Dose Adjustment Note    Deven Jaramillo is a 70 y.o. male being treated with the medication famotidine    Patient Data:    Vital Signs (Most Recent):  Pulse: 72 (03/27/25 2005)  Resp: 18 (03/27/25 2005)  BP: (!) 92/59 (03/27/25 2005)  SpO2: 97 % (03/27/25 2005) Vital Signs (72h Range):  Pulse:  [69-97]   Resp:  [10-30]   BP: ()/(45-71)   SpO2:  [81 %-98 %]      Recent Labs   Lab 03/27/25  1539 03/27/25 2037   CREATININE 2.7* 2.6*     Serum creatinine: 2.6 mg/dL (H) 03/27/25 2037  Estimated creatinine clearance: 25.1 mL/min (A)    Famotidine PO 20 mg daily will be changed to famotidine PO 20 mg every other day for CrCl <30 mL/min.     Pharmacist's Name: Ashlie Delaney  Pharmacist's Extension: [phone]

## 2025-03-28 NOTE — H&P
[facility] Intensive [facility])  Critical Care Medicine  History & Physical    Patient Name: Deven Jaramillo  MRN: [id number]  Admission Date: 3/27/2025  Hospital Length of Stay: 0 days  Code Status: No Order  Attending Physician: Michelle Negron MD   Primary Care Provider: Alina Jane NP   Principal Problem: Acute metabolic encephalopathy    Subjective:     HPI:  Deven Jaramillo is a 70-year-old male with a PMHx of HTN, HCC, neuroendocrine tumor of pancreatic tail, chronic Hep C, last chemo was in February.     He presented by EMS from [facility] for AMS and lethargy. Patient was found to have a low glucose of 11 on scene, which was treated. Wife and daughter at bedside provided HPI and report patient has been fatigued for several day, but was still walking until today. He has been having increasingly slurred speech, lethargy over the past 2 days, with today much less verbal, but following commands. They thought his speech changes was d/t not wearing dentures. He has been dealing with decreased oral/solute intake, anasarca recently, and diarrhea x 1 week. He was being seen at [facility] today for f/u and to decide on further chemo tx. No known fever/chills, other complaints or indications of bleeding per family.     On arrival in ED, patient again hypoglycemic with only temporary improvement to treatment. He was hypotensive, tachypneic, and has a new oxygen requirement. He was non-verbal, but did follow commands. He did respond to sepsis fluids, but again became hypotensive after. CT head showed remote changes, but no acute findings. Unknown if seizure PTA. Labs significant for severe anion gap metabolic acidosis. Lactate >12. K 3.3, elevated Mg, BUN 58, Cr 2.7, , ALT 79, t-bili 4.5, INR 1.8, normal lipase. Troponin 0.65, , CPK 2699. TSH 10 w/normal T4. Procalcitonin 7.5. Flu/COV neg. UA w/no infection. Stool studies including C-diff sent, C-diff antigen/toxin negative. ABG:  6.831/47.8/114/8, ionized Ca 1.03. While in the ED, he did become non-responsive, not adequately protecting airway, therefore he was intubated. On intubation, some aspirate noted in airway. CXR w/bibasilar atelectasis. Critical care consulted for admission.           Hospital/ICU Course:  No notes on file     Past Medical History:   Diagnosis Date    Anasarca     Hepatocellular carcinoma     HLD (hyperlipidemia)     Hypertension     Immunosuppression due to drug therapy     Pancreatic cancer     Primary neuroendocrine tumor of pancreatic tail    Unspecified viral hepatitis C without hepatic coma        Past Surgical History:   Procedure Laterality Date    CT guided biopsy of pancreas         Review of patient's allergies indicates:  No Known Allergies    Family History    None       Tobacco Use    Smoking status: Every Day     Current packs/day: 1.00     Average packs/day: 1 pack/day for 50.0 years (50.0 ttl pk-yrs)     Types: Cigarettes     Start date: 3/27/1975    Smokeless tobacco: Never   Substance and Sexual Activity    Alcohol use: Not Currently     Alcohol/week: 28.0 standard drinks of alcohol     Types: 28 Cans of beer per week    Drug use: Never    Sexual activity: Not Currently         Review of Systems   Unable to perform ROS: Intubated     Objective:     Vital Signs (Most Recent):  Pulse: 72 (03/27/25 2005)  Resp: 18 (03/27/25 2005)  BP: (!) 92/59 (03/27/25 2005)  SpO2: 97 % (03/27/25 2005) Vital Signs (24h Range):  Pulse:  [69-97] 72  Resp:  [10-30] 18  SpO2:  [81 %-98 %] 97 %  BP: ()/(45-71) 92/59     Weight: 74.5 kg (164 lb 4.8 oz)  Body mass index is 25.73 kg/m².      Intake/Output Summary (Last 24 hours) at 3/27/2025 2030  Last data filed at 3/27/2025 1958  Gross per 24 hour   Intake 2185 ml   Output --   Net 2185 ml        Physical Exam  Vitals and nursing note reviewed.   Constitutional:       General: He is in acute distress.      Appearance: He is normal weight. He is ill-appearing and  toxic-appearing.      Interventions: Face mask in place.   HENT:      Head: Normocephalic and atraumatic.      Mouth/Throat:      Lips: Pink.      Mouth: Mucous membranes are dry.   Eyes:      General: Scleral icterus present.      Pupils: Pupils are equal, round, and reactive to light.   Neck:      Vascular: JVD present.   Cardiovascular:      Rate and Rhythm: Normal rate and regular rhythm.      Pulses: Normal pulses.      Heart sounds: Normal heart sounds.   Pulmonary:      Effort: Pulmonary effort is normal.      Breath sounds: Normal breath sounds and air entry.   Abdominal:      General: Abdomen is protuberant. Bowel sounds are decreased.      Palpations: Abdomen is soft.      Tenderness: There is no abdominal tenderness.   Musculoskeletal:      Cervical back: Full passive range of motion without pain.      Right lower le+ Edema present.      Left lower le+ Edema present.   Skin:     General: Skin is warm and dry.      Capillary Refill: Capillary refill takes 2 to 3 seconds.   Neurological:      Mental Status: He is lethargic.      GCS: GCS eye subscore is 4. GCS verbal subscore is 1. GCS motor subscore is 4.      Cranial Nerves: No facial asymmetry.          Vents:       Lines/Drains/Airways       Central Venous Catheter Line  Duration             Percutaneous Central Line - Triple Lumen  25 2025 Internal Jugular Right <1 day              Drain  Duration                  Urethral Catheter 25 1722 Latex 16 Fr. <1 day              Peripheral Intravenous Line  Duration                  Peripheral IV - Single Lumen 25 1340 18 G Right Antecubital <1 day         Peripheral IV - Single Lumen 25 1546 20 G Left Antecubital <1 day         Peripheral IV - Single Lumen 25 2001 22 G Anterior;Right Hand <1 day                    Significant Labs:    CBC/Anemia Profile:  Recent Labs   Lab 25  1539 25  1545   WBC 4.73  --    HGB 15.4  --    HCT 49.1 41     --    MCV  100*  --    RDW 15.4*  --         Chemistries:  Recent Labs   Lab 03/27/25  1539      K 3.3*      CO2 6*   BUN 58*   CREATININE 2.7*   CALCIUM 8.6*   ALBUMIN 2.0*   BILITOT 4.5*   ALKPHOS 60   ALT 79*   *   GLUCOSE 33*   MG 3.7*       Coagulation:   Recent Labs   Lab 03/27/25  1539   INR 1.8*   APTT 49.2*     Lactic Acid:   Recent Labs   Lab 03/27/25  1539   LACTATE >12.0*     POCT Glucose:   Recent Labs   Lab 03/27/25  1858 03/27/25  1932 03/27/25  1948   POCTGLUCOSE 64* 81 69*   BNP  Recent Labs   Lab 03/27/25  1539   *     Troponin:   Recent Labs   Lab 03/27/25  1539   TROPONINI 0.065*     ABG  Recent Labs   Lab 03/27/25  1545   PH 6.831*   PO2 114*   PCO2 47.8*   HCO3 8.0*   BE -26*       Urine Studies:   Recent Labs   Lab 03/27/25  1739   COLORU Yellow   APPEARANCEUA Clear   PHUR 6.0   SPECGRAV 1.015   PROTEINUA Trace*   GLUCUA Negative   BILIRUBINUA 1+*   OCCULTUA Negative   NITRITE Negative   UROBILINOGEN Negative   LEUKOCYTESUR Negative     All pertinent labs within the past 24 hours have been reviewed.    Significant Imaging:   I have reviewed all pertinent imaging results/findings within the past 24 hours.  Assessment/Plan:     Neuro  * Acute metabolic encephalopathy  - HAGMA w/acute liver and renal failure  - treat above  - intubated, mental status barrier to extubation  - CT head negative  - stat EEG ordered  - neuro monitoring w/further workup as indicated    Pulmonary  Acute hypoxemic respiratory failure  Patient with Hypoxic Respiratory failure which is Acute.  he is not on home oxygen. Supplemental oxygen was provided and noted-      Signs/symptoms of respiratory failure include- tachypnea and lethargy. Contributing diagnoses includes - Aspiration and encephalopathy  Labs and images were reviewed. Patient Has recent ABG, which has been reviewed. Will treat underlying causes and adjust management of respiratory failure as follows-     - SpO2 in 80's on exam  - intubated with  improvement  - MRSA, sputum cx pending  - continue abx  - titrate to SpO2 >90% and per ABG  - treatment of underlying causes of encephalopathy  - no COPD dx, but +smoker, scheduled duo-nebs    On mechanically assisted ventilation  - vent bundle in place  - titrate support per ABG and SpO2>90%  - SAT/SBT, however mental status likely barrier to extubation    Cardiac/Vascular  Hypertension  Patient's blood pressure range in the last 24 hours was: BP  Min: 72/45  Max: 136/71.The patient's inpatient anti-hypertensive regimen is listed below:  Current Antihypertensives     Plan  - currently requiring vasopressor support  - add meds as indicated  - check echo    Renal/  RJ (acute kidney injury)  RJ is likely due to pre-renal azotemia due to intravascular volume depletion. Baseline creatinine is  0.9 . Most recent creatinine and eGFR are listed below.  Recent Labs     03/27/25  1539 03/27/25 2037   CREATININE 2.7* 2.6*   EGFRNORACEVR 25* 26*      Plan  - RJ is improving  - Avoid nephrotoxins and renally dose meds for GFR listed above  - Monitor urine output, serial BMP, and adjust therapy as needed  - bicarb fluids  - serial labs  - optimize hemodynamics  - strict I&O's    ID  Sepsis with acute organ dysfunction  This patient does not have evidence of infective focus, suspect aspiration pneumonia  My overall impression is septic shock due to lactate > 4, MAP < 65, and SBP < 90.  Source: Unknown  Antibiotics given-   Antibiotics (72h ago, onward)      Start     Stop Route Frequency Ordered    03/28/25 0900  mupirocin 2 % ointment         04/02/25 0859 Nasl 2 times daily 03/27/25 2205 03/28/25 0300  piperacillin-tazobactam (ZOSYN) 4.5 g in D5W 100 mL IVPB (MB+)         -- IV Every 8 hours (non-standard times) 03/27/25 2032 03/27/25 2145  rifAXIMin tablet 550 mg         -- OG 2 times daily 03/27/25 2033 03/27/25 2145  vancomycin 1,500 mg in 0.9% NaCl 250 mL IVPB (admixture device)         -- IV Once 03/27/25  "2040 03/27/25 2132  vancomycin - pharmacy to dose  (vancomycin IVPB (PEDS and ADULTS))        Placed in "And" Linked Group    -- IV pharmacy to manage frequency 03/27/25 2032          Latest lactate reviewed-  Recent Labs   Lab 03/27/25 2037   LACTATE >12.0*     Organ dysfunction indicated by Acute kidney injury, Acute liver injury, Acute respiratory failure, and Encephalopathy    Fluid challenge was administered in ED     Post- resuscitation assessment Yes - I attest a sepsis perfusion exam was performed within 6 hours of sepsis, severe sepsis, or septic shock presentation, following fluid resuscitation.    Will Start Pressors- Levophed for MAP of 65  Source control achieved by: antibiotics  - trend lactate  - cultures pending  - continue abx  - CT c/a/p pending      Endocrine  Hypoglycemia  - likely in setting of acute liver dysfunction  - requiring D10 infusion  - hourly glucose checks until stable  - will give stress dose steroids, wean as able    GI  Acute liver failure with hepatic coma  MELD 3.0: 29 at 3/27/2025  8:37 PM  MELD-Na: 28 at 3/27/2025  8:37 PM  Calculated from:  Serum Creatinine: 2.6 mg/dL at 3/27/2025  8:37 PM  Serum Sodium: 144 mmol/L (Using max of 137 mmol/L) at 3/27/2025  8:37 PM  Total Bilirubin: 3.2 mg/dL at 3/27/2025  8:37 PM  Serum Albumin: 1.4 g/dL (Using min of 1.5 g/dL) at 3/27/2025  8:37 PM  INR(ratio): 2.1 at 3/27/2025  8:37 PM  Age at listing (hypothetical): 70 years  Sex: Male at 3/27/2025  8:37 PM    Patient is likely to have ischemic hepatitis as evidenced by abnormalities in AST, ALT, T. BILI, ALBUMIN, and INR. Etiology includes septic shock and respiratory failure and hypoxemia. Continue to monitor liver function while treating underlying condition. Daily labs to include CMP, Liver Function Panel, and PT/INR.    AST   Date Value Ref Range Status   03/27/2025 1,164 (H) 11 - 45 unit/L Final     ALT   Date Value Ref Range Status   03/27/2025 221 (H) 10 - 44 unit/L Final     - " suspect multifactorial etiology-HCC, sepsis, ischemic  - supportive care, avoid hypotension, hypoxia, hepatotoxins  - did receive sepsis fluid bolus in ED with response, but gradual decline in BP  - albumin, pressors  - lactulose, rifaximin  - monitor coags, holding pharm anticoagulation in setting of elevated INR  - maintain body temp        Critical Care Daily Checklist:    A: Awake: RASS Goal/Actual Goal:    Actual:     B: Spontaneous Breathing Trial Performed?     C: SAT & SBT Coordinated?  Y                      D: Delirium: CAM-ICU     E: Early Mobility Performed? Yes   F: Feeding Goal:    Status:     Current Diet Order   Procedures    Diet NPO      AS: Analgesia/Sedation Propofol/fentanyl   T: Thromboembolic Prophylaxis SCD   H: HOB > 300 Yes   U: Stress Ulcer Prophylaxis (if needed) Pepcid   G: Glucose Control Serial monitoring   B: Bowel Function     I: Indwelling Catheter (Lines & Aleman) Necessity reviewed   D: De-escalation of Antimicrobials/Pharmacotherapies reviewed    Plan for the day/ETD ICU admission    Code Status:  Family/Goals of Care: No Order  TBD     Critical Care Time: 64 minutes  Critical secondary to mechanical ventilation, high risk monitoring, vasopressor support, serial labs. Patient has a condition that poses threat to life and bodily function.     Critical care was time spent personally by me on the following activities: development of treatment plan with patient or surrogate and bedside caregivers, discussions with consultants, evaluation of patient's response to treatment, examination of patient, ordering and performing treatments and interventions, ordering and review of laboratory studies, ordering and review of radiographic studies, pulse oximetry, re-evaluation of patient's condition. This critical care time did not overlap with that of any other provider or involve time for any procedures.     Africa Fried NP  Critical Care Medicine  [facility] - Intensive Care ([facility])

## 2025-03-28 NOTE — ASSESSMENT & PLAN NOTE
Patient with Hypoxic Respiratory failure which is Acute.  he is not on home oxygen. Supplemental oxygen was provided and noted-      Signs/symptoms of respiratory failure include- tachypnea and lethargy. Contributing diagnoses includes - Aspiration and encephalopathy  Labs and images were reviewed. Patient Has recent ABG, which has been reviewed. Will treat underlying causes and adjust management of respiratory failure as follows-     - SpO2 in 80's on exam  - intubated with improvement  - MRSA, sputum cx pending  - continue abx  - titrate to SpO2 >90% and per ABG  - treatment of underlying causes of encephalopathy  - no COPD dx, but +smoker, scheduled duo-nebs

## 2025-03-28 NOTE — PROCEDURES
Deven Jaramillo is a 70 y.o. male patient.    Pulse: 72 (03/27/25 2005)  Resp: 18 (03/27/25 2005)  BP: (!) 92/59 (03/27/25 2005)  SpO2: 97 % (03/27/25 2005)  Weight: 74.5 kg (164 lb 4.8 oz) (03/27/25 1527)       Central Line    Date/Time: 3/27/2025 8:23 PM    Performed by: Africa Fried NP  Authorized by: Africa Fried NP    Location procedure was performed:  [facility] EMERGENCY DEPARTMENT  Pre-operative diagnosis:  Shock  Post-operative diagnosis:  Shock  Consent Done ?:  Yes  Time out complete?: Verified correct patient, procedure, equipment, staff, and site/side    Indications:  Hemodynamic monitoring, vascular access and med administration  Anesthesia:  Local infiltration  Local anesthetic:  Lidocaine 1% without epinephrine  Preparation:  Skin prepped with ChloraPrep  Skin prep agent dried: Skin prep agent completely dried prior to procedure    Sterile barriers: All five maximal sterile barriers used - gloves, gown, cap, mask and large sterile sheet    Hand hygiene: Hand hygiene performed immediately prior to central venous catheter insertion    Location:  Right internal jugular  Catheter type:  Triple lumen  Catheter size:  7 Fr  Ultrasound guidance: Yes    Vessel Caliber:  Large   patent  Comprressibility:  Normal  Doppler:  Not done  Needle advanced into vessel with real time ultrasound guidance.    Guidewire confirmed in vessel.    Steril sheath on probe.    Sterile gel used.  Manometry: No    Number of attempts:  1  Securement:  Chlorhexidine patch, line sutured, sterile dressing applied and blood return through all ports  Complications: No    Guidewire: guidewire removed intact, verified with nurse    XRay:  Placement verified by x-ray, no pneumothorax on x-ray, tip termination and successful placementTermination Site: superior vena cava    DIAMOND Richardson-  Critical Care Medicine  [facility]    3/27/2025

## 2025-03-28 NOTE — SUBJECTIVE & OBJECTIVE
Past Medical History:   Diagnosis Date    Anasarca     Hepatocellular carcinoma     HLD (hyperlipidemia)     Hypertension     Immunosuppression due to drug therapy     Pancreatic cancer     Primary neuroendocrine tumor of pancreatic tail    Unspecified viral hepatitis C without hepatic coma        Past Surgical History:   Procedure Laterality Date    CT guided biopsy of pancreas         Review of patient's allergies indicates:  No Known Allergies    Family History    None       Tobacco Use    Smoking status: Every Day     Current packs/day: 1.00     Average packs/day: 1 pack/day for 50.0 years (50.0 ttl pk-yrs)     Types: Cigarettes     Start date: 3/27/1975    Smokeless tobacco: Never   Substance and Sexual Activity    Alcohol use: Not Currently     Alcohol/week: 28.0 standard drinks of alcohol     Types: 28 Cans of beer per week    Drug use: Never    Sexual activity: Not Currently         Review of Systems   Unable to perform ROS: Intubated     Objective:     Vital Signs (Most Recent):  Pulse: 72 (03/27/25 2005)  Resp: 18 (03/27/25 2005)  BP: (!) 92/59 (03/27/25 2005)  SpO2: 97 % (03/27/25 2005) Vital Signs (24h Range):  Pulse:  [69-97] 72  Resp:  [10-30] 18  SpO2:  [81 %-98 %] 97 %  BP: ()/(45-71) 92/59     Weight: 74.5 kg (164 lb 4.8 oz)  Body mass index is 25.73 kg/m².      Intake/Output Summary (Last 24 hours) at 3/27/2025 2030  Last data filed at 3/27/2025 1958  Gross per 24 hour   Intake 2185 ml   Output --   Net 2185 ml        Physical Exam  Vitals and nursing note reviewed.   Constitutional:       General: He is in acute distress.      Appearance: He is normal weight. He is ill-appearing and toxic-appearing.      Interventions: Face mask in place.   HENT:      Head: Normocephalic and atraumatic.      Mouth/Throat:      Lips: Pink.      Mouth: Mucous membranes are dry.   Eyes:      General: Scleral icterus present.      Pupils: Pupils are equal, round, and reactive to light.   Neck:      Vascular: JVD  present.   Cardiovascular:      Rate and Rhythm: Normal rate and regular rhythm.      Pulses: Normal pulses.      Heart sounds: Normal heart sounds.   Pulmonary:      Effort: Pulmonary effort is normal.      Breath sounds: Normal breath sounds and air entry.   Abdominal:      General: Abdomen is protuberant. Bowel sounds are decreased.      Palpations: Abdomen is soft.      Tenderness: There is no abdominal tenderness.   Musculoskeletal:      Cervical back: Full passive range of motion without pain.      Right lower le+ Edema present.      Left lower le+ Edema present.   Skin:     General: Skin is warm and dry.      Capillary Refill: Capillary refill takes 2 to 3 seconds.   Neurological:      Mental Status: He is lethargic.      GCS: GCS eye subscore is 4. GCS verbal subscore is 1. GCS motor subscore is 4.      Cranial Nerves: No facial asymmetry.          Vents:       Lines/Drains/Airways       Central Venous Catheter Line  Duration             Percutaneous Central Line - Triple Lumen  25 Internal Jugular Right <1 day              Drain  Duration                  Urethral Catheter 25 1722 Latex 16 Fr. <1 day              Peripheral Intravenous Line  Duration                  Peripheral IV - Single Lumen 25 1340 18 G Right Antecubital <1 day         Peripheral IV - Single Lumen 25 1546 20 G Left Antecubital <1 day         Peripheral IV - Single Lumen 25 2001 22 G Anterior;Right Hand <1 day                    Significant Labs:    CBC/Anemia Profile:  Recent Labs   Lab 25  1539 25  1545   WBC 4.73  --    HGB 15.4  --    HCT 49.1 41     --    *  --    RDW 15.4*  --         Chemistries:  Recent Labs   Lab 25  1539      K 3.3*      CO2 6*   BUN 58*   CREATININE 2.7*   CALCIUM 8.6*   ALBUMIN 2.0*   BILITOT 4.5*   ALKPHOS 60   ALT 79*   *   GLUCOSE 33*   MG 3.7*       Coagulation:   Recent Labs   Lab 25  1539   INR 1.8*    APTT 49.2*     Lactic Acid:   Recent Labs   Lab 03/27/25  1539   LACTATE >12.0*     POCT Glucose:   Recent Labs   Lab 03/27/25  1858 03/27/25  1932 03/27/25  1948   POCTGLUCOSE 64* 81 69*   BNP  Recent Labs   Lab 03/27/25  1539   *     Troponin:   Recent Labs   Lab 03/27/25  1539   TROPONINI 0.065*     ABG  Recent Labs   Lab 03/27/25  1545   PH 6.831*   PO2 114*   PCO2 47.8*   HCO3 8.0*   BE -26*       Urine Studies:   Recent Labs   Lab 03/27/25  1739   COLORU Yellow   APPEARANCEUA Clear   PHUR 6.0   SPECGRAV 1.015   PROTEINUA Trace*   GLUCUA Negative   BILIRUBINUA 1+*   OCCULTUA Negative   NITRITE Negative   UROBILINOGEN Negative   LEUKOCYTESUR Negative     All pertinent labs within the past 24 hours have been reviewed.    Significant Imaging:   I have reviewed all pertinent imaging results/findings within the past 24 hours.

## 2025-03-28 NOTE — PROGRESS NOTES
Tele-ICU Admit note    Reason for ICU admission:    Septic shock    HPI:     71 yo male with a PMHx as described below ( chemotx) presents with lethargy.    Was having slurred speech, lethargy over the past couple days as well as diarrhea past week    On the scene glucose was 11 which was treated       In ED:    Initially hypotensive 70s/50s non-verbal, but did follow commands.   BP responded to fluids  Intubated to protect airways    Pmx:    Neuroendocrine tumor of pancreatic tail, last chemo was in February  HTN  chronic Hep C  Hepatocellular carcinoma      Significant labs:    6.83/47/114/8  AG 27  Lactate 12  K 3.3  BUN 60  Creat 2.7    Bili 4.5  INR 1.8  CPK 2700  PCT 7.5    Significant imaging:    CT head:    No evidence of an acute intracranial abnormality.       CT chest/abd without contrast:    Diffuse colonic wall thickening concerning for infectious or inflammatory colitis.     Small bowel dilation with multiple air-fluid levels.     Small bilateral pleural effusions.  Moderate bilateral consolidative opacities with some air bronchograms concerning for infection or aspiration.      Moderate ascites.  Malignant ascites not excluded on the basis of this exam.      ECG:      Pt viewed at 11:10 pm:    66 sinus, 92%, 107/46,     , R 22, FiO2 100%, +8    Norepi, Bicarb infusions    Impression/Plan:    Septic shock  Norepi  Vanco + Zosyn  Stress dose steroids    Metabolic encephalopathy  Sepsis, lever and renal failure  ETT for airway protection  CT head negative  Neuro monitoring, EEG       Acute hypoxemic respiratory failure  Possible aspiration  Mech ventilation management       RJ  Sepsis, ATN  and prerenal    Severe metabolic acidosis  Sepsis  Lactate  Bicarb infusion    Hypoglycemia  D10 fluids  In setting of liver failure      Acute liver failure  Transamenasemia  As well as elevated INR, bili, ammonia  In setting of sepsis and shock liver  lactulose, rifaximin

## 2025-03-28 NOTE — RESPIRATORY THERAPY
Nitric started at 20PPM due to decreased O2 saturations.   Verbal order Per Africa Fried NP.  METHB completed    Chacha (RRT) , Africa (NP), & RN at bedside.

## 2025-03-28 NOTE — HPI
Deven Jaramillo is a 70-year-old male with a PMHx of HTN, HCC, neuroendocrine tumor of pancreatic tail, chronic Hep C, last chemo was in February.     He presented by EMS from [facility] for AMS and lethargy. Patient was found to have a low glucose of 11 on scene, which was treated. Wife and daughter at bedside provided HPI and report patient has been fatigued for several day, but was still walking until today. He has been having increasingly slurred speech, lethargy over the past 2 days, with today much less verbal, but following commands. They thought his speech changes was d/t not wearing dentures. He has been dealing with decreased oral/solute intake, anasarca recently, and diarrhea x 1 week. He was being seen at [facility] today for f/u and to decide on further chemo tx. No known fever/chills, other complaints or indications of bleeding per family.     On arrival in ED, patient again hypoglycemic with only temporary improvement to treatment. He was hypotensive, tachypneic, and has a new oxygen requirement. He was non-verbal, but did follow commands. He did respond to sepsis fluids, but again became hypotensive after. CT head showed remote changes, but no acute findings. Unknown if seizure PTA. Labs significant for severe anion gap metabolic acidosis. Lactate >12. K 3.3, elevated Mg, BUN 58, Cr 2.7, , ALT 79, t-bili 4.5, INR 1.8, normal lipase. Troponin 0.65, , CPK 2699. TSH 10 w/normal T4. Procalcitonin 7.5. Flu/COV neg. UA w/no infection. Stool studies including C-diff sent, C-diff antigen/toxin negative. AB.831/47.8/114/8, ionized Ca 1.03. While in the ED, he did become non-responsive, not adequately protecting airway, therefore he was intubated. On intubation, some aspirate noted in airway. CXR w/bibasilar atelectasis. Critical care consulted for admission.

## 2025-03-28 NOTE — SIGNIFICANT EVENT
Patient received 10+ minutes of ACLS and continued to be asystole.  I personally saw and examined the patient. Upon entering patient's room, patient was noted to have absent respirations both on visualization and on auscultation. Patient had negative carotid pulsations, absent heart sounds on auscultation, and asystole rhythm noted on monitor. Patient's pupils were fixed and dilated with negative corneal reflexes noted. Patient also had negative withdrawal from stimuli. Time of death was noted at 5:30 am on 3/28/2025 with cause of death being cardiopulmonary arrest secondary to septic shock requiring mechanical ventilation and pressor support related to complications in patient with known cancer on chemo. No family at bedside at time of exam. Full discharge summary/death note to follow by primary team.

## 2025-03-28 NOTE — PROGRESS NOTES
Discussed CT c/a/p findings with Dr. Saunders overnight. He is not stable enough to leave unit at this time. Will keep OGT to suction. Patient is unresponsive on vent with no sedation and no need for restraints. Patient hypoxic overnight, no signs of obstruction, mucus plugging, peak/plat pressures wnl, getting poor volumes. Vent adjustments made with no improvement. Attempted nitric oxide with no improvement. D/w Dr. Hamm overnight who suggested try a dose of paralytic, agrees with other care. Dose of Rocuronium given without change. Vent circuit changed. Nitric oxide discontinued and patient placed back on vent and further adjustments made with satisfactory improvement. Throughout night, pressor requirement increasing, persistent lactic acidosis and worsening renal, liver function, coagulopathy. Patient's family was contacted to update and allowed to come to hospital for visitation. Plan of care ongoing.     Africa Fried, [facility]  Critical Care Medicine  [facility]

## 2025-03-28 NOTE — ASSESSMENT & PLAN NOTE
MELD 3.0: 29 at 3/27/2025  8:37 PM  MELD-Na: 28 at 3/27/2025  8:37 PM  Calculated from:  Serum Creatinine: 2.6 mg/dL at 3/27/2025  8:37 PM  Serum Sodium: 144 mmol/L (Using max of 137 mmol/L) at 3/27/2025  8:37 PM  Total Bilirubin: 3.2 mg/dL at 3/27/2025  8:37 PM  Serum Albumin: 1.4 g/dL (Using min of 1.5 g/dL) at 3/27/2025  8:37 PM  INR(ratio): 2.1 at 3/27/2025  8:37 PM  Age at listing (hypothetical): 70 years  Sex: Male at 3/27/2025  8:37 PM    Patient is likely to have ischemic hepatitis as evidenced by abnormalities in AST, ALT, T. BILI, ALBUMIN, and INR. Etiology includes septic shock and respiratory failure and hypoxemia. Continue to monitor liver function while treating underlying condition. Daily labs to include CMP, Liver Function Panel, and PT/INR.    AST   Date Value Ref Range Status   03/27/2025 1,164 (H) 11 - 45 unit/L Final     ALT   Date Value Ref Range Status   03/27/2025 221 (H) 10 - 44 unit/L Final     - suspect multifactorial etiology-HCC, sepsis, ischemic  - supportive care, avoid hypotension, hypoxia, hepatotoxins  - did receive sepsis fluid bolus in ED with response, but gradual decline in BP  - albumin, pressors  - lactulose, rifaximin  - monitor coags, holding pharm anticoagulation in setting of elevated INR  - maintain body temp

## 2025-03-31 LAB
BACTERIA SPT CULT: ABNORMAL
BACTERIA SPT CULT: ABNORMAL
BACTERIA STL CULT: NORMAL
E COLI SXT1 STL QL IA: NEGATIVE
E COLI SXT2 STL QL IA: NEGATIVE
GRAM STN SPEC: ABNORMAL
MRSA SPEC QL CULT: NORMAL

## 2025-04-01 LAB
BACTERIA BLD CULT: ABNORMAL
BACTERIA BLD CULT: ABNORMAL
GRAM STN SPEC: ABNORMAL